# Patient Record
Sex: FEMALE | Race: WHITE | URBAN - METROPOLITAN AREA
[De-identification: names, ages, dates, MRNs, and addresses within clinical notes are randomized per-mention and may not be internally consistent; named-entity substitution may affect disease eponyms.]

---

## 2017-06-15 ENCOUNTER — EMERGENCY (EMERGENCY)
Facility: HOSPITAL | Age: 47
LOS: 0 days | Discharge: HOME | End: 2017-06-15

## 2017-06-15 DIAGNOSIS — I95.1 ORTHOSTATIC HYPOTENSION: ICD-10-CM

## 2017-06-28 DIAGNOSIS — Z87.891 PERSONAL HISTORY OF NICOTINE DEPENDENCE: ICD-10-CM
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
## 2017-06-28 DIAGNOSIS — M54.2 CERVICALGIA: ICD-10-CM
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
## 2017-06-28 DIAGNOSIS — R29.810 FACIAL WEAKNESS: ICD-10-CM

## 2017-06-28 DIAGNOSIS — Z90.711 ACQUIRED ABSENCE OF UTERUS WITH REMAINING CERVICAL STUMP: ICD-10-CM
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
## 2017-06-28 DIAGNOSIS — Z79.899 OTHER LONG TERM (CURRENT) DRUG THERAPY: ICD-10-CM

## 2017-06-28 DIAGNOSIS — M54.5 LOW BACK PAIN: ICD-10-CM

## 2017-07-10 ENCOUNTER — OUTPATIENT (OUTPATIENT)
Dept: OUTPATIENT SERVICES | Facility: HOSPITAL | Age: 47
LOS: 1 days | Discharge: HOME | End: 2017-07-10

## 2017-07-10 DIAGNOSIS — I95.1 ORTHOSTATIC HYPOTENSION: ICD-10-CM

## 2017-07-10 DIAGNOSIS — M54.5 LOW BACK PAIN: ICD-10-CM

## 2019-01-15 ENCOUNTER — RESULT REVIEW (OUTPATIENT)
Age: 49
End: 2019-01-15

## 2019-05-30 ENCOUNTER — RECORD ABSTRACTING (OUTPATIENT)
Age: 49
End: 2019-05-30

## 2019-05-30 DIAGNOSIS — Z82.0 FAMILY HISTORY OF EPILEPSY AND OTHER DISEASES OF THE NERVOUS SYSTEM: ICD-10-CM

## 2019-05-30 DIAGNOSIS — R20.8 OTHER DISTURBANCES OF SKIN SENSATION: ICD-10-CM

## 2019-05-30 DIAGNOSIS — Z84.89 FAMILY HISTORY OF OTHER SPECIFIED CONDITIONS: ICD-10-CM

## 2019-05-30 PROBLEM — Z00.00 ENCOUNTER FOR PREVENTIVE HEALTH EXAMINATION: Status: ACTIVE | Noted: 2019-05-30

## 2019-06-26 ENCOUNTER — TRANSCRIPTION ENCOUNTER (OUTPATIENT)
Age: 49
End: 2019-06-26

## 2019-07-09 ENCOUNTER — APPOINTMENT (OUTPATIENT)
Dept: OTOLARYNGOLOGY | Facility: CLINIC | Age: 49
End: 2019-07-09

## 2019-07-30 ENCOUNTER — INPATIENT (INPATIENT)
Facility: HOSPITAL | Age: 49
LOS: 1 days | Discharge: HOME | End: 2019-08-01
Attending: INTERNAL MEDICINE | Admitting: INTERNAL MEDICINE
Payer: COMMERCIAL

## 2019-07-30 VITALS
SYSTOLIC BLOOD PRESSURE: 146 MMHG | DIASTOLIC BLOOD PRESSURE: 65 MMHG | OXYGEN SATURATION: 100 % | RESPIRATION RATE: 18 BRPM | HEART RATE: 77 BPM

## 2019-07-30 DIAGNOSIS — Z90.711 ACQUIRED ABSENCE OF UTERUS WITH REMAINING CERVICAL STUMP: Chronic | ICD-10-CM

## 2019-07-30 LAB
ALBUMIN SERPL ELPH-MCNC: 4 G/DL — SIGNIFICANT CHANGE UP (ref 3.5–5.2)
ALP SERPL-CCNC: 62 U/L — SIGNIFICANT CHANGE UP (ref 30–115)
ALT FLD-CCNC: 14 U/L — SIGNIFICANT CHANGE UP (ref 0–41)
ANION GAP SERPL CALC-SCNC: 11 MMOL/L — SIGNIFICANT CHANGE UP (ref 7–14)
APTT BLD: 36 SEC — SIGNIFICANT CHANGE UP (ref 27–39.2)
AST SERPL-CCNC: 23 U/L — SIGNIFICANT CHANGE UP (ref 0–41)
BASOPHILS # BLD AUTO: 0.07 K/UL — SIGNIFICANT CHANGE UP (ref 0–0.2)
BASOPHILS NFR BLD AUTO: 1 % — SIGNIFICANT CHANGE UP (ref 0–1)
BILIRUB SERPL-MCNC: 0.7 MG/DL — SIGNIFICANT CHANGE UP (ref 0.2–1.2)
BUN SERPL-MCNC: 14 MG/DL — SIGNIFICANT CHANGE UP (ref 10–20)
CALCIUM SERPL-MCNC: 10.2 MG/DL — HIGH (ref 8.5–10.1)
CHLORIDE SERPL-SCNC: 103 MMOL/L — SIGNIFICANT CHANGE UP (ref 98–110)
CO2 SERPL-SCNC: 25 MMOL/L — SIGNIFICANT CHANGE UP (ref 17–32)
CREAT SERPL-MCNC: 0.7 MG/DL — SIGNIFICANT CHANGE UP (ref 0.7–1.5)
EOSINOPHIL # BLD AUTO: 0.11 K/UL — SIGNIFICANT CHANGE UP (ref 0–0.7)
EOSINOPHIL NFR BLD AUTO: 1.5 % — SIGNIFICANT CHANGE UP (ref 0–8)
GLUCOSE SERPL-MCNC: 89 MG/DL — SIGNIFICANT CHANGE UP (ref 70–99)
HCG SERPL QL: NEGATIVE — SIGNIFICANT CHANGE UP
HCT VFR BLD CALC: 38.7 % — SIGNIFICANT CHANGE UP (ref 37–47)
HGB BLD-MCNC: 13.1 G/DL — SIGNIFICANT CHANGE UP (ref 12–16)
IMM GRANULOCYTES NFR BLD AUTO: 0.3 % — SIGNIFICANT CHANGE UP (ref 0.1–0.3)
INR BLD: 1 RATIO — SIGNIFICANT CHANGE UP (ref 0.65–1.3)
LYMPHOCYTES # BLD AUTO: 1.88 K/UL — SIGNIFICANT CHANGE UP (ref 1.2–3.4)
LYMPHOCYTES # BLD AUTO: 25.8 % — SIGNIFICANT CHANGE UP (ref 20.5–51.1)
MAGNESIUM SERPL-MCNC: 1.9 MG/DL — SIGNIFICANT CHANGE UP (ref 1.8–2.4)
MCHC RBC-ENTMCNC: 30.9 PG — SIGNIFICANT CHANGE UP (ref 27–31)
MCHC RBC-ENTMCNC: 33.9 G/DL — SIGNIFICANT CHANGE UP (ref 32–37)
MCV RBC AUTO: 91.3 FL — SIGNIFICANT CHANGE UP (ref 81–99)
MONOCYTES # BLD AUTO: 0.59 K/UL — SIGNIFICANT CHANGE UP (ref 0.1–0.6)
MONOCYTES NFR BLD AUTO: 8.1 % — SIGNIFICANT CHANGE UP (ref 1.7–9.3)
NEUTROPHILS # BLD AUTO: 4.61 K/UL — SIGNIFICANT CHANGE UP (ref 1.4–6.5)
NEUTROPHILS NFR BLD AUTO: 63.3 % — SIGNIFICANT CHANGE UP (ref 42.2–75.2)
NRBC # BLD: 0 /100 WBCS — SIGNIFICANT CHANGE UP (ref 0–0)
PLATELET # BLD AUTO: 196 K/UL — SIGNIFICANT CHANGE UP (ref 130–400)
POTASSIUM SERPL-MCNC: 4.9 MMOL/L — SIGNIFICANT CHANGE UP (ref 3.5–5)
POTASSIUM SERPL-SCNC: 4.9 MMOL/L — SIGNIFICANT CHANGE UP (ref 3.5–5)
PROT SERPL-MCNC: 6.8 G/DL — SIGNIFICANT CHANGE UP (ref 6–8)
PROTHROM AB SERPL-ACNC: 11.5 SEC — SIGNIFICANT CHANGE UP (ref 9.95–12.87)
RBC # BLD: 4.24 M/UL — SIGNIFICANT CHANGE UP (ref 4.2–5.4)
RBC # FLD: 12.1 % — SIGNIFICANT CHANGE UP (ref 11.5–14.5)
SODIUM SERPL-SCNC: 139 MMOL/L — SIGNIFICANT CHANGE UP (ref 135–146)
WBC # BLD: 7.28 K/UL — SIGNIFICANT CHANGE UP (ref 4.8–10.8)
WBC # FLD AUTO: 7.28 K/UL — SIGNIFICANT CHANGE UP (ref 4.8–10.8)

## 2019-07-30 PROCEDURE — 93010 ELECTROCARDIOGRAM REPORT: CPT

## 2019-07-30 PROCEDURE — 99291 CRITICAL CARE FIRST HOUR: CPT

## 2019-07-30 PROCEDURE — 71045 X-RAY EXAM CHEST 1 VIEW: CPT | Mod: 26

## 2019-07-30 PROCEDURE — 99284 EMERGENCY DEPT VISIT MOD MDM: CPT

## 2019-07-30 PROCEDURE — 70450 CT HEAD/BRAIN W/O DYE: CPT | Mod: 26

## 2019-07-30 PROCEDURE — 0042T: CPT

## 2019-07-30 RX ORDER — TRAMADOL HYDROCHLORIDE 50 MG/1
50 TABLET ORAL ONCE
Refills: 0 | Status: DISCONTINUED | OUTPATIENT
Start: 2019-07-30 | End: 2019-07-30

## 2019-07-30 RX ORDER — ACETAMINOPHEN 500 MG
975 TABLET ORAL ONCE
Refills: 0 | Status: COMPLETED | OUTPATIENT
Start: 2019-07-30 | End: 2019-07-30

## 2019-07-30 RX ORDER — TOPIRAMATE 25 MG
200 TABLET ORAL
Refills: 0 | Status: DISCONTINUED | OUTPATIENT
Start: 2019-07-30 | End: 2019-08-01

## 2019-07-30 RX ORDER — ACETAMINOPHEN 500 MG
650 TABLET ORAL EVERY 6 HOURS
Refills: 0 | Status: DISCONTINUED | OUTPATIENT
Start: 2019-07-30 | End: 2019-08-01

## 2019-07-30 RX ORDER — CHLORHEXIDINE GLUCONATE 213 G/1000ML
1 SOLUTION TOPICAL
Refills: 0 | Status: DISCONTINUED | OUTPATIENT
Start: 2019-07-30 | End: 2019-08-01

## 2019-07-30 RX ORDER — ENOXAPARIN SODIUM 100 MG/ML
40 INJECTION SUBCUTANEOUS DAILY
Refills: 0 | Status: DISCONTINUED | OUTPATIENT
Start: 2019-07-30 | End: 2019-08-01

## 2019-07-30 RX ADMIN — Medication 975 MILLIGRAM(S): at 19:04

## 2019-07-30 RX ADMIN — TRAMADOL HYDROCHLORIDE 50 MILLIGRAM(S): 50 TABLET ORAL at 21:00

## 2019-07-30 RX ADMIN — Medication 975 MILLIGRAM(S): at 14:20

## 2019-07-30 RX ADMIN — TRAMADOL HYDROCHLORIDE 50 MILLIGRAM(S): 50 TABLET ORAL at 20:10

## 2019-07-30 RX ADMIN — Medication 200 MILLIGRAM(S): at 19:02

## 2019-07-30 NOTE — H&P ADULT - ASSESSMENT
48yoF with PMH of migraine?,  benign meningioma s/p craniotomy/ resection in 2015, followed by gamma knife radiation in 2016, now in remission comes to ED with headache and left arm weakness.     #) Left Sided headache with Left arm weakness/ sensory changes ( r/o TIA vs CVA vs Complicated migraine)  - pt endorses h/o migraine, states this episode was same but worse  - Stroke code on arrival, NIHSS 3  - CT head and perfusion study- unremarkable  - seen by neuro  - admit to Stroke unit, neuro check q4hr  - f/u EEG  - F/u MRI brain  - seizure precautions  - keep Mg >2, f/u PM levels  - hba1c, lipid profile f/u. 48yoF with PMH of migraine?,  benign meningioma s/p craniotomy/ resection in 2015, followed by gamma knife radiation in 2016, now in remission comes to ED with headache and left arm weakness.     #) Left Sided headache with Left arm weakness/ sensory changes ( r/o TIA vs CVA vs Complicated migraine)  - pt endorses h/o migraine, states this episode was same but worse  - Stroke code on arrival, NIHSS 3  - CT head and perfusion study- unremarkable  - seen by neuro  - admit to Stroke unit, neuro check q4hr  - f/u EEG  - F/u MRI brain  - seizure precautions  - keep Mg >2, f/u PM levels  - hba1c, lipid profile f/u.   - consider echo with bubble study- if indicated     #) H/o Meningioma   - s/p resection and gamma knife radiation      #) Diet- regular  #) Ambulate as tolerated, PT and Phy eval  #) DVT ppx- lovenox  #) GI ppx- not indicated  #) dispo- admit to stroke unit  #) full code 48yoF with PMH of migraine?,  benign meningioma s/p craniotomy/ resection in 2015, followed by gamma knife radiation in 2016, now in remission comes to ED with headache and left arm weakness.     #) Left Sided headache with Left arm weakness/ sensory changes ( r/o TIA vs CVA vs Complicated migraine)  - pt endorses h/o migraine, states this episode was same but worse  - Stroke code on arrival, NIHSS 3  - CT head and perfusion study- unremarkable  - seen by neuro  - admit to Stroke unit, neuro check q4hr  - f/u EEG  - F/u MRI brain  - seizure precautions  - keep Mg >2, f/u PM levels  - hba1c, lipid profile f/u.   - consider echo with bubble study- if indicated     #) H/o Meningioma   - s/p resection and gamma knife radiation  - c/w home med topamax - pt states was started for seizure ppx.       #) Diet- regular  #) Ambulate as tolerated, PT and Phy eval  #) DVT ppx- lovenox  #) GI ppx- not indicated  #) dispo- admit to stroke unit  #) full code

## 2019-07-30 NOTE — ED ADULT NURSE NOTE - INTERVENTIONS DEFINITIONS
Monitor for mental status changes and reorient to person, place, and time/Physically safe environment: no spills, clutter or unnecessary equipment/[place] to call system/Stretcher in lowest position, wheels locked, appropriate side rails in place

## 2019-07-30 NOTE — H&P ADULT - NSHPSOCIALHISTORY_GEN_ALL_CORE
Pt is . Pt is an active smoker, smokes half PPD for 25 years. Denies any alcohol use, recreational drug use.

## 2019-07-30 NOTE — ED ADULT NURSE NOTE - OBJECTIVE STATEMENT
Pt presents to ED BIBA for headache. As per pt last night at 8pm she started to have a headache associated with left arm pain. 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 Stroke code called on pt (please refer to stroke flowsheet.)

## 2019-07-30 NOTE — H&P ADULT - ATTENDING COMMENTS
PHYSICAL EXAM:    CONSTITUTIONAL: NAD, comfortable lying in stretcher  ENMT: PERRLA, right eye ptosis, no tonsillar erythema, exudates, or enlargement, neck supple, tenderness on deep palpation of cervical spine, No JVD  PSYCH: Alert & Oriented X3  RESPIRATORY: Clear to percussion bilaterally; No rales, rhonchi, wheezing, or rubs  CARDIOVASCULAR: Regular rate and rhythm; No murmurs, rubs, or gallops, negative edema  GASTROINTESTINAL: Soft, Nontender, Nondistended; Bowel sounds present  MSK: LUE strength 3/5, RUE 5/5, decreased sensation over LUE, bilateral lower extremity strength 5/5 and sensation intact, 2+ Peripheral Pulses, No clubbing, cyanosis  SKIN: No rashes or lesions    49 yo F with PMHx of Migraine Headaches, Benign Meningioma s/p craniotomy/resection (2015) and gamma knife radiation (2016) in remission with residual right sided facial sensory deficit and ptosis presents with complaint of sudden onset left sided, throbbing headache with radiation to the neck occurring on the night prior to presentation. Patient denies photo/phonophobia. Patient states this headache was unlike her usual headaches which is normally right sided and burning in quality. Patient then felt LUE numbness as well as weakness. Stroke Code activated in ED, not a tpa candidate. Patient denies chest pain, palpitations, shortness of breath, fever, chills, nausea, vomiting, recent travel, camping, illness, or sick contacts.     #CVA vs. Complicated Migraine Headaches vs. Epileptiform Activity   -Neurology recommendations noted  -Admit to Stroke Unit  -CT Perfusion of Head and Neck unremarkable   -F/U MRI Brain  -F/U EEG  -Neuro checks per unit/Q4H  -Seizure precautions  -PT/OT  -F/U Hgb A1C%, lipid panel     #History of Meningioma   -Continue Topamax   -F/U Topiramate level in AM    #Active Smoker  -Smoking cessation advised, declines nicotine patch    Disposition: Home when medically stable PHYSICAL EXAM:    CONSTITUTIONAL: NAD, comfortable lying in stretcher  ENMT: PERRLA, right eye ptosis, no tonsillar erythema, exudates, or enlargement, neck supple, tenderness on deep palpation of cervical spine, No JVD  PSYCH: Alert & Oriented X3  RESPIRATORY: Clear to percussion bilaterally; No rales, rhonchi, wheezing, or rubs  CARDIOVASCULAR: Regular rate and rhythm; No murmurs, rubs, or gallops, negative edema  GASTROINTESTINAL: Soft, Nontender, Nondistended; Bowel sounds present  MSK: LUE strength 3/5, RUE 5/5, decreased sensation over LUE, bilateral lower extremity strength 5/5 and sensation intact, 2+ Peripheral Pulses, No clubbing, cyanosis  SKIN: No rashes or lesions    49 yo F with PMHx of Migraine Headaches, Benign Meningioma s/p craniotomy/resection (2015) and gamma knife radiation (2016) in remission with residual right sided facial sensory deficit and ptosis presents with complaint of sudden onset left sided, throbbing headache with radiation to the neck occurring on the night prior to presentation. Patient denies photo/phonophobia. Patient states this headache was unlike her usual headaches which is normally right sided and burning in quality. Patient then felt LUE numbness as well as weakness. Stroke Code activated in ED, not a tpa candidate. Patient denies chest pain, palpitations, shortness of breath, fever, chills, nausea, vomiting, recent travel, camping, illness, or sick contacts.     #CVA vs. Complicated Migraine Headaches vs. Epileptiform Activity   -Neurology recommendations noted  -Admit to Stroke Unit  -CT Perfusion of Head and Neck unremarkable   -F/U MRI Brain  -F/U EEG  -F/U 2D-Echo  -Neuro checks per unit/Q4H  -Seizure precautions  -PT/OT  -F/U Hgb A1C%, lipid panel     #History of Meningioma   -Continue Topamax   -F/U Topiramate level in AM    #Active Smoker  -Smoking cessation advised, declines nicotine patch    Disposition: Home when medically stable PHYSICAL EXAM:    CONSTITUTIONAL: NAD, comfortable lying in stretcher  ENMT: PERRLA, right eye ptosis, no tonsillar erythema, exudates, or enlargement, neck supple, tenderness on deep palpation of cervical spine, No JVD  PSYCH: Alert & Oriented X3  RESPIRATORY: Clear to percussion bilaterally; No rales, rhonchi, wheezing, or rubs  CARDIOVASCULAR: Regular rate and rhythm; No murmurs, rubs, or gallops, negative edema  GASTROINTESTINAL: Soft, Nontender, Nondistended; Bowel sounds present  MSK: LUE strength 3/5, RUE 5/5, decreased sensation over LUE, bilateral lower extremity strength 5/5 and sensation intact, 2+ Peripheral Pulses, No clubbing, cyanosis  SKIN: No rashes or lesions    47 yo F with PMHx of Migraine Headaches, Benign Meningioma s/p craniotomy/resection (2015) and gamma knife radiation (2016) in remission with residual right sided facial sensory deficit and ptosis presents with complaint of sudden onset left sided, throbbing headache with radiation to the neck occurring on the night prior to presentation. Patient denies photo/phonophobia. Patient states this headache was unlike her usual headaches which is normally right sided and burning in quality. Patient then felt LUE numbness as well as weakness. Stroke Code activated in ED, not a tpa candidate. Patient denies chest pain, palpitations, shortness of breath, fever, chills, nausea, vomiting, recent travel, camping, illness, or sick contacts.     #CVA vs. Complicated Migraine Headaches vs. Epileptiform Activity   -Neurology recommendations noted  -Admit to Stroke Unit  -CT Perfusion of Head and Neck unremarkable   -F/U MRI Brain  -F/U EEG  -F/U 2D-Echo  -Neuro checks per unit/Q4H  -Seizure precautions  -PT/OT  -F/U Hgb A1C%, lipid panel     #History of Meningioma   -Continue Topamax   -F/U Topiramate level in AM    #Active Smoker  -Smoking cessation advised, declines nicotine patch    Disposition: Home when medically stable.

## 2019-07-30 NOTE — ED PROVIDER NOTE - CHIEF COMPLAINT
The patient is a 48y Female complaining of The patient is a 48y Female complaining of left arm weakness

## 2019-07-30 NOTE — ED PROVIDER NOTE - PHYSICAL EXAMINATION
VITALS:  I have reviewed the initial vital signs.  GENERAL: Well-developed, well-nourished, in no acute distress. Nontoxic.  HEENT: NC/AT. Sclera clear. No conjunctival injection. EOMI, PERRLA. Mucous membranes moist.  NECK: supple w FROM. No cervical adenopathy.  CARDIO: RRR, nl S1 and S2. No murmurs, rubs, or gallops. No peripheral edema. 2+ radial pulses bilaterally.  PULM: Normal effort. No tachypnea or retractions. CTA b/l without wheezes, rales, or rhonchi.  MSK: FROM to extremities x 4. No joint swelling, erythema, or ttp.  GI: Abdomen soft and non-distended. Nontender in all four quadrants without rebound or guarding.  SKIN: Warm, dry. No pallor or rashes. Capillary refill <2 seconds.  NEURO: A&Ox3. Speech clear. No facial droop. CN II-XII intact. 4/5 strength to left upper extremity, otherwise 5/5 strength to upper and lower extremities b/l. Sensation intact and equal throughout. No pronator drift. Finger to nose intact. Normal heel to shin.

## 2019-07-30 NOTE — ED ADULT NURSE NOTE - CAS ED AMA REASON YN
Yes/pt states she did not want to wait anymore for mri results. md shine is aware. pt left before dr came down to sign papers. at time pt was A&OX4. VSS

## 2019-07-30 NOTE — ED PROVIDER NOTE - CLINICAL SUMMARY MEDICAL DECISION MAKING FREE TEXT BOX
48yoF with h/o brain tumor s/p resection and radiation in remission and baseline R ptosis and asensation of R face, also on topiramate and Xanax, p/w c/f stroke. States 9-10pm last night she was at home and began having headache which then led to paresthesias, numbness, and weakness of her L arm. Drove herself to PMD today and was sent here. On exam, afebrile, hemodynamically stable, saturating well, NAD, mildly sedate/flat affect, nontoxic appearing, head NCAT, PERRL, EOMI, anicteric, MMM, RRR, nml S1/S2, no m/r/g, lungs CTAB, no w/r/r, abd soft, NT, ND, nml BS, no rebound or guarding, AAOx3, CN's 3-12 intact other than R ptosis and decreased R facial sensation, L arm stiffness and drift, also L leg weakness with nml sensation, no leg cyanosis or edema, 2+ symm radial pulses, skin warm, well perfused, no rashes or hives. Character/stiffness as well as past h/o migraines of lower suspicion for acute CVA, also not TPA candidate. Code Stroke called and co-managed with Dr. Maximo Carreon of neurology, recommends CT perfusion. No e/o acute mass on CT. Character of low suspicion for dissection and no associated signs. No e/o arrhythmia, anemia, or gross electrolyte abnormalities. Unremarkable CT perfusion/angio. Patient well appearing, hemodynamically stable. Admitted to stroke unit for further monitoring, w/u, and care.

## 2019-07-30 NOTE — ED PROVIDER NOTE - OBJECTIVE STATEMENT
48 year old female w hx of benign meningioma s/p gamma knife radiation 2016, followed by Dr. Ruiz, otherwise no pmhx presents to the ED via EMS with a left-sided, nonradiating, constant, throbbing headache since 9:00 PM last night. Patient also endorses left arm pain, numbness, and weakness since that time as well. Denies head trauma/LOC, vision changes, facial droop, speech changes, neck pain/stiffness, chest pain, shortness of breath, abd pain, n/v, gait difficulty, rashes, recent travel or illness.

## 2019-07-30 NOTE — PROGRESS NOTE ADULT - SUBJECTIVE AND OBJECTIVE BOX
HPI:  She is a 48 year old right handed female with a PMH of benign meningioma s/p gamma knife radiation 2016, presents to the ED with left-sided, throbbing headache and since 9:00 PM on the night prior to presentation. She expresses left arm numbness, pain and weakness since then as well. Stroke code called on arrival to the ED for left upper extremity drift with right facial sensory deficit and weakness since gamma knife procedure in 2016.    Tests:  < from: CT Brain Stroke Protocol (07.30.19 @ 12:47) >  IMPRESSION:    In comparison with the prior noncontrast CT scan of the brain dated June 26, 2016:    Stable examination.    No acute intracranial hemorrhage or large territorial infarct.    Vitals  T(F): 97.7 (07-30-19 @ 12:57), Max: 97.7 (07-30-19 @ 12:57)  HR: 74 (07-30-19 @ 13:02) (74 - 77)  BP: 104/52 (07-30-19 @ 13:02) (104/52 - 146/65)  RR: 16 (07-30-19 @ 13:02) (16 - 18)  SpO2: 99% (07-30-19 @ 13:02) (98% - 100%)                        13.1   7.28  )-----------( 196      ( 30 Jul 2019 13:10 )             38.7     Neuro Exam:  Awake alert oriented to self place time.  PEARLA EOMI no gaze no visual deficit.  No aphasia no dysarthria, chronic right facial palsy.  No drift of RUE, RLE/LLE. Drift present in LUE.   No ataxia of upper or lower extremities.   No sensory deficit besides chronic right facial sensory deficit.  No neglect or inattention.  NIHSS:3  Impression:  She is a 48 year old right handed female with a PMH of benign meningioma s/p gamma knife radiation 2016, presents to the ED with left-sided, throbbing headache and since 9:00 PM on the night prior to presentation. She expresses left arm numbness, pain and weakness since then as well. Stroke code called on arrival to the ED for left upper extremity drift with right facial sensory deficit and weakness since gamma knife procedure in 2016.  Etiology of symptoms unknown, vascular and epileptiform cause should be ruled out.     Suggestions:  CT Perfusion stat.  MRI brain with and without sergio.  Stat EEG.  Seizure precautions.  Keep Magnesium level >2.  Suggest IV hydration if medically able.   Q4 neuro checks.      Cody Smith NP  [phone]

## 2019-07-30 NOTE — PROGRESS NOTE ADULT - ATTENDING COMMENTS
Patient seen and examined with NP during stroke code.  Patient had left hand weakness since last night.  Had resection of meningioma in right temporal region and gamma knife radiation    NIHSS 3  mrankin 1    Plan as above

## 2019-07-30 NOTE — ED PROVIDER NOTE - ATTENDING CONTRIBUTION TO CARE
48yoF with h/o brain tumor s/p resection and radiation in remission and baseline R ptosis and asensation of R face, also on topiramate and Xanax, p/w c/f stroke. States 9-10pm last night she was at home and began having headache which then led to paresthesias, numbness, and weakness of her L arm. Drove herself to PMD today and was sent here. On exam, afebrile, hemodynamically stable, saturating well, NAD, mildly sedate/flat affect, nontoxic appearing, head NCAT, PERRL, EOMI, anicteric, MMM, RRR, nml S1/S2, no m/r/g, lungs CTAB, no w/r/r, abd soft, NT, ND, nml BS, no rebound or guarding, AAOx3, CN's 3-12 intact other than R ptosis and decreased R facial sensation, L arm stiffness and drift, also L leg weakness with nml sensation, no leg cyanosis or edema, 2+ symm radial pulses, skin warm, well perfused, no rashes or hives. Character/stiffness as well as past h/o migraines of lower suspicion for acute CVA, also not TPA candidate. Code Stroke called and co-managed with Dr. Maximo Carreon of neurology, recommends CT perfusion. No e/o acute mass on CT. Character of low suspicion for dissection and no associated signs. No e/o arrhythmia, anemia, or gross electrolyte abnormalities. 48yoF with h/o brain tumor s/p resection and radiation in remission and baseline R ptosis and asensation of R face, also on topiramate and Xanax, p/w c/f stroke. States 9-10pm last night she was at home and began having headache which then led to paresthesias, numbness, and weakness of her L arm. Drove herself to PMD today and was sent here. On exam, afebrile, hemodynamically stable, saturating well, NAD, mildly sedate/flat affect, nontoxic appearing, head NCAT, PERRL, EOMI, anicteric, MMM, RRR, nml S1/S2, no m/r/g, lungs CTAB, no w/r/r, abd soft, NT, ND, nml BS, no rebound or guarding, AAOx3, CN's 3-12 intact other than R ptosis and decreased R facial sensation, L arm stiffness and drift, also L leg weakness with nml sensation, no leg cyanosis or edema, 2+ symm radial pulses, skin warm, well perfused, no rashes or hives. Character/stiffness as well as past h/o migraines of lower suspicion for acute CVA, also not TPA candidate. Code Stroke called and co-managed with Dr. Maximo Carreon of neurology, recommends CT perfusion. No e/o acute mass on CT. Character of low suspicion for dissection and no associated signs. No e/o arrhythmia, anemia, or gross electrolyte abnormalities. Unremarkable CT perfusion/angio. Patient well appearing, hemodynamically stable. Admitted to stroke unit for further monitoring, w/u, and care.

## 2019-07-30 NOTE — H&P ADULT - NSHPLABSRESULTS_GEN_ALL_CORE
13.1   7.28  )-----------( 196      ( 30 Jul 2019 13:10 )             38.7       07-30    139  |  103  |  14  ----------------------------<  89  4.9   |  25  |  0.7    Ca    10.2<H>      30 Jul 2019 13:10    TPro  6.8  /  Alb  4.0  /  TBili  0.7  /  DBili  x   /  AST  23  /  ALT  14  /  AlkPhos  62  07-30                  PT/INR - ( 30 Jul 2019 13:10 )   PT: 11.50 sec;   INR: 1.00 ratio         PTT - ( 30 Jul 2019 13:10 )  PTT:36.0 sec    Lactate Trend      CAPILLARY BLOOD GLUCOSE  100 (30 Jul 2019 13:24)      POCT Blood Glucose.: 100 mg/dL (30 Jul 2019 12:35)    IMPRESSION:    In comparison with the prior noncontrast CT scan of the brain dated June 26, 2016:    Stable examination.    No acute intracranial hemorrhage or large territorial infarct.    < end of copied text >    < from: CT Perfusion w/ Maps w/ IV Cont (07.30.19 @ 14:34) >    IMPRESSION:    1.  Perfusion study, unremarkable.    2.  CTA of the head and neck, unremarkable.    < end of copied text >

## 2019-07-30 NOTE — ED PROVIDER NOTE - NS ED ROS FT
CONSTITUTIONAL: (-) fevers, (-) chills, (-) decreased appetite, (-) diaphoresis  EYES: (-) vision changes, (-) blurry vision, (-) double vision  ENT: (-) congestion, (-) rhinorrhea  NECK: (-) neck pain, (-) neck stiffness  CARDIO: (-) chest pain, (-) palpitations, (-) edema  PULM: (-) cough, (-) sputum, (-) shortness of breath  GI: (-) nausea, (-) vomiting, (-) diarrhea, (-) constipation, (-) abdominal pain, (-) melena, (-) hematochezia  : (-) dysuria, (-) hematuria, (-) frequency, (-) flank pain  MSK: (-) back pain, (-) joint swelling, (-) joint stiffness, (-) gait difficulty  SKIN: (-) rashes, (-) wounds, (-) pallor, (-) ecchymosis  NEURO: see HPI    *all other systems negative except as documented above and in the HPI*

## 2019-07-31 LAB
ANION GAP SERPL CALC-SCNC: 10 MMOL/L — SIGNIFICANT CHANGE UP (ref 7–14)
BASOPHILS # BLD AUTO: 0.04 K/UL — SIGNIFICANT CHANGE UP (ref 0–0.2)
BASOPHILS NFR BLD AUTO: 0.8 % — SIGNIFICANT CHANGE UP (ref 0–1)
BUN SERPL-MCNC: 14 MG/DL — SIGNIFICANT CHANGE UP (ref 10–20)
CALCIUM SERPL-MCNC: 9.7 MG/DL — SIGNIFICANT CHANGE UP (ref 8.5–10.1)
CHLORIDE SERPL-SCNC: 109 MMOL/L — SIGNIFICANT CHANGE UP (ref 98–110)
CHOLEST SERPL-MCNC: 197 MG/DL — SIGNIFICANT CHANGE UP (ref 100–200)
CO2 SERPL-SCNC: 22 MMOL/L — SIGNIFICANT CHANGE UP (ref 17–32)
CREAT SERPL-MCNC: 0.6 MG/DL — LOW (ref 0.7–1.5)
EOSINOPHIL # BLD AUTO: 0.16 K/UL — SIGNIFICANT CHANGE UP (ref 0–0.7)
EOSINOPHIL NFR BLD AUTO: 3.3 % — SIGNIFICANT CHANGE UP (ref 0–8)
ESTIMATED AVERAGE GLUCOSE: 91 MG/DL — SIGNIFICANT CHANGE UP (ref 68–114)
GLUCOSE SERPL-MCNC: 95 MG/DL — SIGNIFICANT CHANGE UP (ref 70–99)
HBA1C BLD-MCNC: 4.8 % — SIGNIFICANT CHANGE UP (ref 4–5.6)
HCT VFR BLD CALC: 35.8 % — LOW (ref 37–47)
HDLC SERPL-MCNC: 54 MG/DL — SIGNIFICANT CHANGE UP
HGB BLD-MCNC: 12 G/DL — SIGNIFICANT CHANGE UP (ref 12–16)
IMM GRANULOCYTES NFR BLD AUTO: 0.2 % — SIGNIFICANT CHANGE UP (ref 0.1–0.3)
LIPID PNL WITH DIRECT LDL SERPL: 134 MG/DL — HIGH (ref 4–129)
LYMPHOCYTES # BLD AUTO: 2.48 K/UL — SIGNIFICANT CHANGE UP (ref 1.2–3.4)
LYMPHOCYTES # BLD AUTO: 50.6 % — SIGNIFICANT CHANGE UP (ref 20.5–51.1)
MAGNESIUM SERPL-MCNC: 2 MG/DL — SIGNIFICANT CHANGE UP (ref 1.8–2.4)
MCHC RBC-ENTMCNC: 30.8 PG — SIGNIFICANT CHANGE UP (ref 27–31)
MCHC RBC-ENTMCNC: 33.5 G/DL — SIGNIFICANT CHANGE UP (ref 32–37)
MCV RBC AUTO: 91.8 FL — SIGNIFICANT CHANGE UP (ref 81–99)
MONOCYTES # BLD AUTO: 0.39 K/UL — SIGNIFICANT CHANGE UP (ref 0.1–0.6)
MONOCYTES NFR BLD AUTO: 8 % — SIGNIFICANT CHANGE UP (ref 1.7–9.3)
NEUTROPHILS # BLD AUTO: 1.82 K/UL — SIGNIFICANT CHANGE UP (ref 1.4–6.5)
NEUTROPHILS NFR BLD AUTO: 37.1 % — LOW (ref 42.2–75.2)
NRBC # BLD: 0 /100 WBCS — SIGNIFICANT CHANGE UP (ref 0–0)
PLATELET # BLD AUTO: 168 K/UL — SIGNIFICANT CHANGE UP (ref 130–400)
POTASSIUM SERPL-MCNC: 4.4 MMOL/L — SIGNIFICANT CHANGE UP (ref 3.5–5)
POTASSIUM SERPL-SCNC: 4.4 MMOL/L — SIGNIFICANT CHANGE UP (ref 3.5–5)
RBC # BLD: 3.9 M/UL — LOW (ref 4.2–5.4)
RBC # FLD: 12.1 % — SIGNIFICANT CHANGE UP (ref 11.5–14.5)
SODIUM SERPL-SCNC: 141 MMOL/L — SIGNIFICANT CHANGE UP (ref 135–146)
TOTAL CHOLESTEROL/HDL RATIO MEASUREMENT: 3.6 RATIO — LOW (ref 4–5.5)
TRIGL SERPL-MCNC: 129 MG/DL — SIGNIFICANT CHANGE UP (ref 10–149)
WBC # BLD: 4.9 K/UL — SIGNIFICANT CHANGE UP (ref 4.8–10.8)
WBC # FLD AUTO: 4.9 K/UL — SIGNIFICANT CHANGE UP (ref 4.8–10.8)

## 2019-07-31 PROCEDURE — 72040 X-RAY EXAM NECK SPINE 2-3 VW: CPT | Mod: 26

## 2019-07-31 PROCEDURE — 93306 TTE W/DOPPLER COMPLETE: CPT | Mod: 26

## 2019-07-31 RX ADMIN — Medication 200 MILLIGRAM(S): at 18:01

## 2019-07-31 RX ADMIN — Medication 200 MILLIGRAM(S): at 05:59

## 2019-07-31 RX ADMIN — ENOXAPARIN SODIUM 40 MILLIGRAM(S): 100 INJECTION SUBCUTANEOUS at 13:41

## 2019-07-31 NOTE — PROGRESS NOTE ADULT - ASSESSMENT
ADITHYA YO 48y Female  MRN#: [id number]   CODE STATUS:_full code       SUBJECTIVE  Patient is a 48y old Female who presents with a chief complaint of Headache/ Left arm weakness (30 Jul 2019 17:40)  Currently admitted to medicine with the primary diagnosis of Left arm weakness   Today is hospital day 1d, and this morning she is resting comfortably in bed and reports no overnight events.     OBJECTIVE  PAST MEDICAL & SURGICAL HISTORY  Migraine  Meningioma  S/P partial hysterectomy    ALLERGIES:  Trileptal (Other (Severe))    MEDICATIONS:  STANDING MEDICATIONS  chlorhexidine 4% Liquid 1 Application(s) Topical <User Schedule>  enoxaparin Injectable 40 milliGRAM(s) SubCutaneous daily  topiramate 200 milliGRAM(s) Oral two times a day    PRN MEDICATIONS  acetaminophen   Tablet .. 650 milliGRAM(s) Oral every 6 hours PRN      VITAL SIGNS: Last 24 Hours  T(C): 36.6 (31 Jul 2019 07:26), Max: 36.6 (31 Jul 2019 07:26)  T(F): 97.9 (31 Jul 2019 07:26), Max: 97.9 (31 Jul 2019 07:26)  HR: 66 (31 Jul 2019 07:26) (59 - 77)  BP: 98/55 (31 Jul 2019 07:26) (96/56 - 146/65)  BP(mean): 78 (30 Jul 2019 19:43) (78 - 78)  RR: 16 (31 Jul 2019 07:26) (16 - 18)  SpO2: 98% (31 Jul 2019 07:26) (97% - 100%)    LABS:                        12.0   4.90  )-----------( 168      ( 31 Jul 2019 05:58 )             35.8     07-31    141  |  109  |  14  ----------------------------<  95  4.4   |  22  |  0.6<L>    Ca    9.7      31 Jul 2019 05:58  Mg     2.0     07-31    TPro  6.8  /  Alb  4.0  /  TBili  0.7  /  DBili  x   /  AST  23  /  ALT  14  /  AlkPhos  62  07-30    PT/INR - ( 30 Jul 2019 13:10 )   PT: 11.50 sec;   INR: 1.00 ratio      PTT - ( 30 Jul 2019 13:10 )  PTT:36.0 sec    RADIOLOGY:    PHYSICAL EXAM:  	GENERAL: NAD, well-developed  	HEAD:  Atraumatic, Normocephalic.   	EYES: Right ptosis, conjunctiva and sclera clear  	NECK: Supple, No JVD  	CHEST/LUNG: Clear to auscultation bilaterally; No wheeze  	HEART: Regular rate and rhythm; No murmurs, rubs, or gallops  	ABDOMEN: Soft, Nontender, Nondistended; Bowel sounds present  	EXTREMITIES:  2+ Peripheral Pulses, No clubbing, cyanosis, or edema  	PSYCH: AAOx3  	NEUROLOGY: No aphasia, no dysarthria, LUE strength 3/5, RUE 5/5, decreased sensation over LUE, bilateral lower extremity strength 5/5 and sensation intact,               SKIN: No rashes or lesions      ASSESSMENT & PLAN  47 yo F with PMHx of Migraine Headaches, Benign Meningioma s/p craniotomy/resection (2015) and gamma knife radiation (2016) in remission with residual right sided facial sensory deficit and ptosis presents with complaint of sudden onset left sided, throbbing headache with radiation to the neck occurring on the night prior to presentation. Patient denies photo/phonophobia. Patient states this headache was unlike her usual headaches which is normally right sided and burning in quality. Patient then felt LUE numbness as well as weakness. Stroke Code activated in ED, not a tpa candidate. Patient denies chest pain, palpitations, shortness of breath, fever, chills, nausea, vomiting, recent travel, camping, illness, or sick contacts.     #left upper extremity drift with right facial sensory deficit and weakness since gamma knife procedure in 2016.  - Etiology of symptoms unknown, vascular and epileptiform cause should be ruled out.   - Neurology recommendations noted  - Admit to Stroke Unit  - CT Perfusion of Head and Neck unremarkable   - F/U MRI Brain  - F/U EEG  - F/U 2D-Echo  - Neuro checks per unit/Q4H  - Seizure precautions  - PT/OT     #History of Meningioma   -Continue Topamax   -F/U Topiramate level in AM    #Active Smoker  -Smoking cessation advised, declines nicotine patch    # Disposition: Home when medically stable.

## 2019-07-31 NOTE — CONSULT NOTE ADULT - ASSESSMENT
IMPRESSION: Rehab of   LUE weakness, R/O CVA, if MRI of brain is negative I suggest MRI of the cervical spine; I added cervical spine Xrays    PRECAUTIONS: [  ] Cardiac  [  ] Respiratory  [  ] Seizures [  ] Contact Isolation  [  ] Droplet Isolation  [  ] Other    Weight Bearing Status:     RECOMMENDATION:    Out of Bed to Chair     DVT/Decubiti Prophylaxis    REHAB PLAN:     [  xx ] Bedside P/T 3-5 times a week   [   ]   Bedside O/T  2-3 times a week             [   ] No Rehab Therapy Indicated                   [   ]  Speech Therapy   Conditioning/ROM                                    ADL  Bed Mobility                                               Conditioning/ROM  Transfers                                                     Bed Mobility  Sitting /Standing Balance                         Transfers                                        Gait Training                                               Sitting/Standing Balance  Stair Training [   ]Applicable                    Home equipment Eval                                                                        Splinting  [   ] Only      GOALS:   ADL   [ xx  ]   Independent                    Transfers  [ xx  ] Independent                          Ambulation  [  xx ] Independent     [ xx   ] ??With device                            [   ]  CG                                                         [   ]  CG                                                                  [   ] CG                            [    ] Min A                                                   [   ] Min A                                                              [   ] Min  A          DISCHARGE PLAN:   [   ]  Good candidate for Intensive Rehabilitation/Hospital based-4A [facility]                                             Will tolerate 3hrs Intensive Rehab Daily                                       [    ]  Short Term Rehab in Skilled Nursing Facility                                       [ xx   ]  Home with Outpatient or VN services                                         [    ]  Possible Candidate for Intensive Hospital based Rehab

## 2019-07-31 NOTE — CONSULT NOTE ADULT - SUBJECTIVE AND OBJECTIVE BOX
HPI:  Pt is a 48yoF with PMH of migraine?,  benign meningioma s/p craniotomy/ resection in 2015, followed by gamma knife radiation in 2016, now in remission comes to ED with headache and left arm weakness. Pt at baseline has right sided ptosis, reduced sensation of R face?.   History dates back to last night, when pt started having left sided temporal and occipital headache, constant, radiating to neck. Pain was gradual in onset, 9/10 during its peak, not relieved by advil. Pt then started experiencing left arm pain with pins and needles sensations with left arm weakness. Denies head trauma/LOC, vision changes, facial droop, speech changes, chest pain, shortness of breath, abd pain, n/v, gait difficulty, rashes, recent travel or illness. Pt was referred here by her PMD-  was a stroke code on arrival, no tpa given, NIHSS 3 on arrival.   admission labs were unremarkable, CT head and perfusion study were normal.   Seen by neurology on arrival. (30 Jul 2019 17:40)      PAST MEDICAL & SURGICAL HISTORY:  Migraine  Meningioma  S/P partial hysterectomy      Hospital Course:  Headache and neck pain and LUE weakness. Awaiting MRI of brain.  TODAY'S SUBJECTIVE & REVIEW OF SYMPTOMS:     Constitutional WNL   Cardio WNL   Resp WNL   GI WNL  Heme WNL  Endo WNL  Skin WNL  MSK WNL  Neuro WNL  Cognitive WNL  Psych WNL      MEDICATIONS  (STANDING):  chlorhexidine 4% Liquid 1 Application(s) Topical <User Schedule>  enoxaparin Injectable 40 milliGRAM(s) SubCutaneous daily  topiramate 200 milliGRAM(s) Oral two times a day    MEDICATIONS  (PRN):  acetaminophen   Tablet .. 650 milliGRAM(s) Oral every 6 hours PRN Moderate Pain (4 - 6)      FAMILY HISTORY:  No pertinent family history in first degree relatives      Allergies    Trileptal (Other (Severe))    Intolerances        SOCIAL HISTORY:    [  ] Etoh  [  ] Smoking  [  ] Substance abuse     Home Environment:  [  ] Home Alone  [  ] Lives with Family  [  ] Home Health Aid    Dwelling:  [  ] Apartment  [  ] Private House  [  ] Adult Home  [  ] Skilled Nursing Facility      [  ] Short Term  [  ] Long Term  [  ] Stairs       Elevator [  ]    FUNCTIONAL STATUS PTA: (Check all that apply)  Ambulation: [ x  ]Independent    [  ] Dependent     [  ] Non-Ambulatory  Assistive Device: [  ] SA Cane  [  ]  Q Cane  [  ] Walker  [  ]  Wheelchair  ADL : [x  ] Independent  [  ]  Dependent       Vital Signs Last 24 Hrs  T(C): 36.7 (31 Jul 2019 15:00), Max: 36.7 (31 Jul 2019 15:00)  T(F): 98 (31 Jul 2019 15:00), Max: 98 (31 Jul 2019 15:00)  HR: 79 (31 Jul 2019 15:00) (66 - 79)  BP: 103/54 (31 Jul 2019 15:00) (96/56 - 103/54)  BP(mean): 73 (31 Jul 2019 15:00) (73 - 78)  RR: 16 (31 Jul 2019 15:00) (16 - 18)  SpO2: 98% (31 Jul 2019 15:00) (97% - 98%)      PHYSICAL EXAM: Alert & Oriented X3  GENERAL: NAD, well-groomed, well-developed  HEAD:  Atraumatic, Normocephalic  EYES: EOMI, PERRLA, conjunctiva and sclera clear  NECK: some tenderness cervical paraspinals  CHEST/LUNG: Clear to percussion bilaterally; No rales, rhonchi, wheezing, or rubs  HEART: Regular rate and rhythm; No murmurs, rubs, or gallops  ABDOMEN: Soft, Nontender, Nondistended; Bowel sounds present  EXTREMITIES:  2+ Peripheral Pulses, No clubbing, cyanosis, or edema    NERVOUS SYSTEM:  Cranial Nerves 2-12 intact [  ] Abnormal  [  ]  ROM: WFL all extremities [  ]  Abnormal [  ]  Motor Strength: WFL all extremities  [  ]  Abnormal [  ]   5/5 LLE, LUE 3/5  Sensation: intact to light touch [  ] Abnormal [  ]  Reflexes: Symmetric [  ]  Abnormal [  ]    FUNCTIONAL STATUS:  Bed Mobility: Independent [  ]  Supervision [  ]  Needs Assistance [  ]  N/A [  ]  Transfers: Independent [  ]  Supervision [  ]  Needs Assistance [  ]  N/A [  ]   Ambulation: Independent [  ]  Supervision [  ]  Needs Assistance [  ]  N/A [  ]  ADL: Independent [  ] Requires Assistance [  ] N/A [  ]      LABS:                        12.0   4.90  )-----------( 168      ( 31 Jul 2019 05:58 )             35.8     07-31    141  |  109  |  14  ----------------------------<  95  4.4   |  22  |  0.6<L>    Ca    9.7      31 Jul 2019 05:58  Mg     2.0     07-31    TPro  6.8  /  Alb  4.0  /  TBili  0.7  /  DBili  x   /  AST  23  /  ALT  14  /  AlkPhos  62  07-30    PT/INR - ( 30 Jul 2019 13:10 )   PT: 11.50 sec;   INR: 1.00 ratio         PTT - ( 30 Jul 2019 13:10 )  PTT:36.0 sec      RADIOLOGY & ADDITIONAL STUDIES:    Assesment:

## 2019-08-01 VITALS
SYSTOLIC BLOOD PRESSURE: 93 MMHG | RESPIRATION RATE: 18 BRPM | OXYGEN SATURATION: 99 % | DIASTOLIC BLOOD PRESSURE: 55 MMHG | HEART RATE: 82 BPM | TEMPERATURE: 98 F

## 2019-08-01 LAB
ANION GAP SERPL CALC-SCNC: 10 MMOL/L — SIGNIFICANT CHANGE UP (ref 7–14)
BASOPHILS # BLD AUTO: 0.06 K/UL — SIGNIFICANT CHANGE UP (ref 0–0.2)
BASOPHILS NFR BLD AUTO: 1.2 % — HIGH (ref 0–1)
BUN SERPL-MCNC: 20 MG/DL — SIGNIFICANT CHANGE UP (ref 10–20)
CALCIUM SERPL-MCNC: 9.4 MG/DL — SIGNIFICANT CHANGE UP (ref 8.5–10.1)
CHLORIDE SERPL-SCNC: 108 MMOL/L — SIGNIFICANT CHANGE UP (ref 98–110)
CO2 SERPL-SCNC: 23 MMOL/L — SIGNIFICANT CHANGE UP (ref 17–32)
CREAT SERPL-MCNC: 0.7 MG/DL — SIGNIFICANT CHANGE UP (ref 0.7–1.5)
EOSINOPHIL # BLD AUTO: 0.16 K/UL — SIGNIFICANT CHANGE UP (ref 0–0.7)
EOSINOPHIL NFR BLD AUTO: 3.2 % — SIGNIFICANT CHANGE UP (ref 0–8)
GLUCOSE SERPL-MCNC: 98 MG/DL — SIGNIFICANT CHANGE UP (ref 70–99)
HCT VFR BLD CALC: 36.5 % — LOW (ref 37–47)
HGB BLD-MCNC: 12.5 G/DL — SIGNIFICANT CHANGE UP (ref 12–16)
IMM GRANULOCYTES NFR BLD AUTO: 0.4 % — HIGH (ref 0.1–0.3)
LYMPHOCYTES # BLD AUTO: 2.3 K/UL — SIGNIFICANT CHANGE UP (ref 1.2–3.4)
LYMPHOCYTES # BLD AUTO: 46.3 % — SIGNIFICANT CHANGE UP (ref 20.5–51.1)
MCHC RBC-ENTMCNC: 31.4 PG — HIGH (ref 27–31)
MCHC RBC-ENTMCNC: 34.2 G/DL — SIGNIFICANT CHANGE UP (ref 32–37)
MCV RBC AUTO: 91.7 FL — SIGNIFICANT CHANGE UP (ref 81–99)
MONOCYTES # BLD AUTO: 0.38 K/UL — SIGNIFICANT CHANGE UP (ref 0.1–0.6)
MONOCYTES NFR BLD AUTO: 7.6 % — SIGNIFICANT CHANGE UP (ref 1.7–9.3)
NEUTROPHILS # BLD AUTO: 2.05 K/UL — SIGNIFICANT CHANGE UP (ref 1.4–6.5)
NEUTROPHILS NFR BLD AUTO: 41.3 % — LOW (ref 42.2–75.2)
NRBC # BLD: 0 /100 WBCS — SIGNIFICANT CHANGE UP (ref 0–0)
PLATELET # BLD AUTO: 152 K/UL — SIGNIFICANT CHANGE UP (ref 130–400)
POTASSIUM SERPL-MCNC: 4 MMOL/L — SIGNIFICANT CHANGE UP (ref 3.5–5)
POTASSIUM SERPL-SCNC: 4 MMOL/L — SIGNIFICANT CHANGE UP (ref 3.5–5)
RBC # BLD: 3.98 M/UL — LOW (ref 4.2–5.4)
RBC # FLD: 12 % — SIGNIFICANT CHANGE UP (ref 11.5–14.5)
SODIUM SERPL-SCNC: 141 MMOL/L — SIGNIFICANT CHANGE UP (ref 135–146)
WBC # BLD: 4.97 K/UL — SIGNIFICANT CHANGE UP (ref 4.8–10.8)
WBC # FLD AUTO: 4.97 K/UL — SIGNIFICANT CHANGE UP (ref 4.8–10.8)

## 2019-08-01 PROCEDURE — 70553 MRI BRAIN STEM W/O & W/DYE: CPT | Mod: 26

## 2019-08-01 PROCEDURE — 99239 HOSP IP/OBS DSCHRG MGMT >30: CPT

## 2019-08-01 RX ORDER — TOPIRAMATE 25 MG
1 TABLET ORAL
Qty: 0 | Refills: 0 | DISCHARGE

## 2019-08-01 RX ORDER — TOPIRAMATE 25 MG
1 TABLET ORAL
Qty: 0 | Refills: 0 | DISCHARGE
Start: 2019-08-01

## 2019-08-01 RX ADMIN — Medication 200 MILLIGRAM(S): at 06:38

## 2019-08-01 RX ADMIN — Medication 0.5 MILLIGRAM(S): at 00:09

## 2019-08-01 NOTE — DISCHARGE NOTE PROVIDER - CARE PROVIDER_API CALL
Rafael Ruiz)  Neuromuscular Medicine  [address] [place] [address], [address]  [place], [place] [id number]  Phone: [phone]  Fax: [phone]  Follow Up Time:

## 2019-08-01 NOTE — PROGRESS NOTE ADULT - ATTENDING COMMENTS
Patient seen and examined independently. Agree with resident note/ history / physical exam and plan of care with following exceptions/additions/updates. Case discussed with house-staff, nursing and patient.  #Progress Note Handoff  Pending (specify):  MRI report  Family discussion: ramya pt   Disposition: Home  Awaiting MRI report, dc home if MRI neg.  time spent on dc  35 min

## 2019-08-01 NOTE — PHYSICAL THERAPY INITIAL EVALUATION ADULT - PASSIVE RANGE OF MOTION EXAMINATION, REHAB EVAL
shoulder wrist and hand limited 25% at end ranges 2/2 pain and spasticity, patient demo stiffness and limited flexibility through cervival spine/Right UE Passive ROM was WFL (within functional limits)/Left LE Passive ROM was WFL (within functional limits)/Right LE Passive ROM was WFL (within functional limits)

## 2019-08-01 NOTE — DISCHARGE NOTE PROVIDER - HOSPITAL COURSE
49 yo F with PMHx of Migraine Headaches, Benign Meningioma s/p craniotomy/resection (2015) and gamma knife radiation (2016) in remission with residual right sided facial sensory deficit and ptosis presents with complaint of sudden onset left sided, throbbing headache with radiation to the neck occurring on the night prior to presentation. Patient denies photo/phonophobia. Patient states this headache was unlike her usual headaches which is normally right sided and burning in quality. Patient then felt LUE numbness as well as weakness. Stroke Code activated in ED, not a tpa candidate. Patient denies chest pain, palpitations, shortness of breath, fever, chills, nausea, vomiting, recent travel, camping, illness, or sick contacts.         - CT Perfusion of Head and Neck unremarkable     -  MRI Brain done and showed     - EEG unremarkable     - 2D-Echo was done, possible PFO, will repeat it with bubble study if MRI is positive for stroke.         observed for 2 days in ED,     stable foe discharge

## 2019-08-01 NOTE — PHYSICAL THERAPY INITIAL EVALUATION ADULT - GENERAL OBSERVATIONS, REHAB EVAL
[phone] Patient encountered semi brumfield in bed + IV lock, NAD receptive to PT, Patient with olf R facial droop

## 2019-08-01 NOTE — PROGRESS NOTE ADULT - ASSESSMENT
ADITHYA YO 48y Female  MRN#: [id number]   CODE STATUS: full code      SUBJECTIVE  Patient is a 48y old Female who presents with a chief complaint of Headache/ Left arm weakness (31 Jul 2019 18:56)  Currently admitted to medicine with the primary diagnosis of Left arm weakness  Today is hospital day 2d, and this morning she is resting comfortably in bed and reports no overnight events.     OBJECTIVE  PAST MEDICAL & SURGICAL HISTORY  Migraine  Meningioma  S/P partial hysterectomy    ALLERGIES:  Trileptal (Other (Severe))    MEDICATIONS:  STANDING MEDICATIONS  chlorhexidine 4% Liquid 1 Application(s) Topical <User Schedule>  enoxaparin Injectable 40 milliGRAM(s) SubCutaneous daily  topiramate 200 milliGRAM(s) Oral two times a day    PRN MEDICATIONS  acetaminophen   Tablet .. 650 milliGRAM(s) Oral every 6 hours PRN      VITAL SIGNS: Last 24 Hours  T(C): 35.9 (01 Aug 2019 08:06), Max: 36.7 (31 Jul 2019 15:00)  T(F): 96.7 (01 Aug 2019 08:06), Max: 98 (31 Jul 2019 15:00)  HR: 63 (01 Aug 2019 08:06) (57 - 79)  BP: 97/49 (01 Aug 2019 08:06) (81/49 - 103/55)  BP(mean): 73 (31 Jul 2019 15:00) (73 - 73)  RR: 18 (01 Aug 2019 08:06) (16 - 19)  SpO2: 99% (01 Aug 2019 08:06) (98% - 99%)    LABS:                        12.5   4.97  )-----------( 152      ( 01 Aug 2019 05:25 )             36.5     08-01    141  |  108  |  20  ----------------------------<  98  4.0   |  23  |  0.7    Ca    9.4      01 Aug 2019 05:25  Mg     2.0     07-31      RADIOLOGY:    PHYSICAL EXAM:    	GENERAL: NAD, well-developed  	HEAD:  Atraumatic, Normocephalic.   	EYES: Right ptosis, conjunctiva and sclera clear  	NECK: Supple, No JVD  	CHEST/LUNG: Clear to auscultation bilaterally; No wheeze  	HEART: Regular rate and rhythm; No murmurs, rubs, or gallops  	ABDOMEN: Soft, Nontender, Nondistended; Bowel sounds present  	EXTREMITIES:  2+ Peripheral Pulses, No clubbing, cyanosis, or edema  	PSYCH: AAOx3  	NEUROLOGY: No aphasia, no dysarthria, LUE strength 3/5, RUE 5/5, decreased sensation over LUE, bilateral lower extremity strength 5/5 and sensation intact,               SKIN: No rashes or lesions      ASSESSMENT & PLAN    49 yo F with PMHx of Migraine Headaches, Benign Meningioma s/p craniotomy/resection (2015) and gamma knife radiation (2016) in remission with residual right sided facial sensory deficit and ptosis presents with complaint of sudden onset left sided, throbbing headache with radiation to the neck occurring on the night prior to presentation. Patient denies photo/phonophobia. Patient states this headache was unlike her usual headaches which is normally right sided and burning in quality. Patient then felt LUE numbness as well as weakness. Stroke Code activated in ED, not a tpa candidate. Patient denies chest pain, palpitations, shortness of breath, fever, chills, nausea, vomiting, recent travel, camping, illness, or sick contacts.     #left upper extremity drift with right facial sensory deficit and weakness since gamma knife procedure in 2016.  - Etiology of symptoms unknown, vascular and epileptiform cause should be ruled out.   - Neurology recommendations noted  - downgrade to 3e   - CT Perfusion of Head and Neck unremarkable   -  MRI Brain done, pending official read   - EEG unremarkable   - 2D-Echo was done, possible PFO, will repeat it with bubble study if MRI is positive for stroke.   - Neuro checks per unit/Q4H  - Seizure precautions  - PT/OT     #History of Meningioma   -Continue Topamax   -F/U Topiramate level in AM    #Active Smoker  -Smoking cessation advised, declines nicotine patch    # Disposition: Home when medically stable.

## 2019-08-01 NOTE — DISCHARGE NOTE PROVIDER - NSDCFUSCHEDAPPT_GEN_ALL_CORE_FT
ADITHYA YO ; 09/09/2019 ; NPP Otolaryng [facility]  ADITHYA YO ; 09/11/2019 ; NPP Neurology [facility]

## 2019-08-01 NOTE — DISCHARGE NOTE PROVIDER - NSDCCPCAREPLAN_GEN_ALL_CORE_FT
PRINCIPAL DISCHARGE DIAGNOSIS  Diagnosis: Left arm weakness  Assessment and Plan of Treatment: continue to follow up with your neurologist as schedulled      SECONDARY DISCHARGE DIAGNOSES  Diagnosis: Headache  Assessment and Plan of Treatment:

## 2019-08-01 NOTE — DISCHARGE NOTE NURSING/CASE MANAGEMENT/SOCIAL WORK - NSDCDPATPORTLINK_GEN_ALL_CORE
You can access the [facility] Patient Portal, offered by [facility], by registering with the following website: [email or web address]

## 2019-08-03 LAB
TOPIRAMATE SERPL-MCNC: 4.3 MCG/ML — SIGNIFICANT CHANGE UP
TOPIRAMATE SERPL-MCNC: 7.1 MCG/ML — SIGNIFICANT CHANGE UP

## 2019-08-05 PROBLEM — G43.909 MIGRAINE, UNSPECIFIED, NOT INTRACTABLE, WITHOUT STATUS MIGRAINOSUS: Chronic | Status: ACTIVE | Noted: 2019-07-30

## 2019-08-05 PROBLEM — D32.9 BENIGN NEOPLASM OF MENINGES, UNSPECIFIED: Chronic | Status: ACTIVE | Noted: 2019-07-30

## 2019-08-08 DIAGNOSIS — F17.210 NICOTINE DEPENDENCE, CIGARETTES, UNCOMPLICATED: ICD-10-CM

## 2019-08-08 DIAGNOSIS — H02.401 UNSPECIFIED PTOSIS OF RIGHT EYELID: ICD-10-CM

## 2019-08-08 DIAGNOSIS — R29.898 OTHER SYMPTOMS AND SIGNS INVOLVING THE MUSCULOSKELETAL SYSTEM: ICD-10-CM

## 2019-08-08 DIAGNOSIS — R51 HEADACHE: ICD-10-CM

## 2019-08-08 DIAGNOSIS — G43.909 MIGRAINE, UNSPECIFIED, NOT INTRACTABLE, WITHOUT STATUS MIGRAINOSUS: ICD-10-CM

## 2019-08-16 ENCOUNTER — APPOINTMENT (OUTPATIENT)
Dept: NEUROLOGY | Facility: CLINIC | Age: 49
End: 2019-08-16
Payer: COMMERCIAL

## 2019-08-16 PROCEDURE — 99215 OFFICE O/P EST HI 40 MIN: CPT

## 2019-08-16 NOTE — PHYSICAL EXAM
[FreeTextEntry1] : NAD.  AOx3.  Intact memory.  Speech fluent, nondysarthric.  CN 2 – 12 Mild right ptosis with divergence on right head tilt with convergence on left head tilt, otherwise normal.  No facial tenderness to palpation or allodynia.  Strength 5/5 RUE, b/l LE.  LUE 3/5 proximal shoulder girdle, distal WF/WE/FF/FE/Pronation/Supination. normal tone, slight winging L scapular. DTRs 2+ throughout.  Plantar response downgoing b/l.  (-) Hoffmans, clonus.  Sensory intact LT/PP, pain, temp, proprioception and vibration.  NL FTN/HKS.  No dysdiadokinesia.  Gait narrow based/NL tandem.

## 2019-08-16 NOTE — ASSESSMENT
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  Pt with recent acute LUE weakness and pain with persistent weakness and negative central w/u, unclear etiology.  Preservation of DTR suggests possible cervical cord process.  Pt has MRI C-spine ordered by PMD.  Lancinating pain at onset s/o possible plexitis particularly with proximal shoulder girdle involvement.  \par \par Plan:\par -  MRI C-spine - ordered by PMD\par - MRI L Brachial Plexus w/w/o sergio\par - LE venous duplex b/l r/o DVT w/ h/o PFO\par - Continue Topamax 200 mg b.i.d.\par - restart Lyrica 50 mg TID\par - EMG/NCS LUE\par - OT\par - RTC in 2 - 4 wks for EMG/NCS\par

## 2019-08-16 NOTE — HISTORY OF PRESENT ILLNESS
[FreeTextEntry1] : Since her last visit, Ms. Chance had an episode of acute L shoulder pain radiating into the LUE with associated persistent LUE weakness started 2 weeks ago.  Had R facial pain the night before and the next day developed lancinating pain with weakness.  Seen by PMD and sent to hospital for further evaluation and was seen by stroke team w/ Dr. Gan.  Stroke w/u including MRI Brain, CTA/Perfusion done and was negative.  Echo w/ evidence of PFO but pt never had LE duplex to evaluate for embolic source.  Pt symptoms were persistent but was discharged with home OT.  Since discharge, symptoms are still persistent with significant proximal and distal weakness. Denies any neck pain or symptoms in the LLE.  Was thought to have facial asymmetry and LLE weakness with LUE weakness while in ER but facial asymmetry chronic from prior surgeries and LLE asymptomatic per patient.  Denies any numbness currently but has some parethesias in the distal fingertips that developed several days afterwards.  Denies any shoulder pain or axillary pain currently.  \par \par Of note, since her last visit she had a followup in May 2019 with radiation surgery at [facility] for recurrent R trigeminal neuralgia.  PT was referred to neurosurgery for possible rhizotomy since unable to perform any additional gamma-knife treatments at this time.  Was also recommended to restart additional medications if needed.  Pt has not made appointment for rhizotomy since does not want additional surgery at this time.

## 2019-08-22 ENCOUNTER — MEDICATION RENEWAL (OUTPATIENT)
Age: 49
End: 2019-08-22

## 2019-09-09 ENCOUNTER — APPOINTMENT (OUTPATIENT)
Dept: OTOLARYNGOLOGY | Facility: CLINIC | Age: 49
End: 2019-09-09

## 2019-09-11 ENCOUNTER — APPOINTMENT (OUTPATIENT)
Dept: NEUROLOGY | Facility: CLINIC | Age: 49
End: 2019-09-11

## 2019-09-12 ENCOUNTER — TRANSCRIPTION ENCOUNTER (OUTPATIENT)
Age: 49
End: 2019-09-12

## 2019-09-12 ENCOUNTER — APPOINTMENT (OUTPATIENT)
Dept: NEUROLOGY | Facility: CLINIC | Age: 49
End: 2019-09-12
Payer: COMMERCIAL

## 2019-09-12 PROCEDURE — 99214 OFFICE O/P EST MOD 30 MIN: CPT | Mod: 25

## 2019-09-12 PROCEDURE — 95886 MUSC TEST DONE W/N TEST COMP: CPT

## 2019-09-12 PROCEDURE — 95910 NRV CNDJ TEST 7-8 STUDIES: CPT

## 2019-09-12 RX ORDER — PREGABALIN 50 MG/1
50 CAPSULE ORAL TWICE DAILY
Qty: 180 | Refills: 1 | Status: DISCONTINUED | COMMUNITY
Start: 2019-08-16 | End: 2019-09-12

## 2019-09-12 NOTE — HISTORY OF PRESENT ILLNESS
[FreeTextEntry1] : Since her last visit, Ms. Chance continues to have pain in the neck base and L trapezius with occasional weakness in the  but not persistent.  States pain with movement particularly in the L shoulder nonradiating.  No atrophy or fasciculations.  No lancinating pain in the arms.  MRI Brachial plexus denied by insurance.  MRI c-spine completed.  \par \par Despite starting lyrica 50 mg TID, R TGN continues to plague her with recent severe exacerbation lasting 4 days with severe allodynia and inability to chew or speak resulting in 5 lbs weight loss.  Continues to take topamax 200 mg BID.  Lyrica made her more groggy but with no effect on lancinating pain.  Has not followed up with neurosurgery at [facility] and has expressed frustration with recurrence of symptoms despite recent gamma-knife treatments.  \par \par Has tried cymbalta, depakote, trileptal, tegretol and lamictal in the past.  Had some response to cymbalta but all other treatments have failed.  Has had good effect with treximet for migraine in the past.  Has never tried TCAs in past for neuralgia.

## 2019-09-12 NOTE — PHYSICAL EXAM
[FreeTextEntry1] : NAD.  AOx3.  Intact memory.  Speech fluent, nondysarthric.  CN 2 – 12 Mild right ptosis with divergence on right head tilt with convergence on left head tilt, otherwise normal.  No facial tenderness to palpation or allodynia.  Strength 5/5 RUE, b/l LE.  LUE 3/5 proximal shoulder girdle, distal WF/WE/FF/FE/Pronation/Supination. normal, no scapular winging. DTRs 2+ throughout.  Plantar response downgoing b/l.  (-) Hoffmans, clonus.  Sensory intact LT/PP, pain, temp, proprioception and vibration.  NL FTN/HKS.  No dysdiadokinesia.  Gait narrow based/NL tandem.

## 2019-09-12 NOTE — ASSESSMENT
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  LUE pain and weakness suspect musculoskeletal and possible rotator cuff injury with pain on movement and relatively stable MRI C-spine with normal EMG.  Pt continues to have intractable neuralgiform pain therefore recommend d/c lyrica and start nortriptyline.  recommend restart treximet for migraine and baclofen for breakthrough neuralgia.  Will refer to neurosurgery Dr. Gaxiola for reassessment. \par \par Plan:\par - Continue Topamax 200 mg b.i.d.\par - d/c Lyrica 50 mg TID\par - start nortriptyline 10 mg QD x 1 week then increase to 20 mg QHS\par - Baclofen 10 mg TID PRN, Treximet 1 tb PO PRN\par - RTC in 3 months\par - neurosurgery evaluation with Dr. Gaxiola\par

## 2019-09-25 ENCOUNTER — APPOINTMENT (OUTPATIENT)
Dept: NEUROLOGY | Facility: CLINIC | Age: 49
End: 2019-09-25

## 2019-10-28 ENCOUNTER — APPOINTMENT (OUTPATIENT)
Dept: NEUROSURGERY | Facility: CLINIC | Age: 49
End: 2019-10-28
Payer: COMMERCIAL

## 2019-10-28 DIAGNOSIS — Z86.59 PERSONAL HISTORY OF OTHER MENTAL AND BEHAVIORAL DISORDERS: ICD-10-CM

## 2019-10-28 DIAGNOSIS — G93.9 DISORDER OF BRAIN, UNSPECIFIED: ICD-10-CM

## 2019-10-28 DIAGNOSIS — Z78.9 OTHER SPECIFIED HEALTH STATUS: ICD-10-CM

## 2019-10-28 DIAGNOSIS — Z87.09 PERSONAL HISTORY OF OTHER DISEASES OF THE RESPIRATORY SYSTEM: ICD-10-CM

## 2019-10-28 DIAGNOSIS — Z87.39 PERSONAL HISTORY OF OTHER DISEASES OF THE MUSCULOSKELETAL SYSTEM AND CONNECTIVE TISSUE: ICD-10-CM

## 2019-10-28 DIAGNOSIS — Z87.898 PERSONAL HISTORY OF OTHER SPECIFIED CONDITIONS: ICD-10-CM

## 2019-10-28 DIAGNOSIS — R29.898 OTHER SYMPTOMS AND SIGNS INVOLVING THE MUSCULOSKELETAL SYSTEM: ICD-10-CM

## 2019-10-28 PROCEDURE — 99203 OFFICE O/P NEW LOW 30 MIN: CPT

## 2019-10-29 PROBLEM — Z86.59 HISTORY OF ANXIETY: Status: RESOLVED | Noted: 2019-10-28 | Resolved: 2019-10-29

## 2019-10-29 PROBLEM — G93.9 BRAIN LESION: Status: RESOLVED | Noted: 2019-10-29 | Resolved: 2019-10-29

## 2019-10-29 PROBLEM — Z87.898 HISTORY OF PARESTHESIA: Status: RESOLVED | Noted: 2019-05-30 | Resolved: 2019-10-29

## 2019-10-29 PROBLEM — R29.898 LEFT ARM WEAKNESS: Status: RESOLVED | Noted: 2019-08-16 | Resolved: 2019-10-29

## 2019-10-29 PROBLEM — Z87.39 HISTORY OF OSTEOPOROSIS: Status: RESOLVED | Noted: 2019-10-28 | Resolved: 2019-10-29

## 2019-10-29 PROBLEM — Z78.9 CURRENT NON-DRINKER OF ALCOHOL: Status: ACTIVE | Noted: 2019-10-28

## 2019-10-29 PROBLEM — Z87.09 HISTORY OF ASTHMA: Status: RESOLVED | Noted: 2019-10-28 | Resolved: 2019-10-29

## 2019-10-29 PROBLEM — Z86.59 HISTORY OF DEPRESSION: Status: RESOLVED | Noted: 2019-10-28 | Resolved: 2019-10-29

## 2019-10-29 RX ORDER — NORTRIPTYLINE HYDROCHLORIDE 10 MG/1
10 CAPSULE ORAL AT BEDTIME
Qty: 60 | Refills: 5 | Status: DISCONTINUED | COMMUNITY
Start: 2019-09-12 | End: 2019-10-29

## 2019-10-29 NOTE — DATA REVIEWED
[de-identified] : \par - MRI brain with/without gado + IAC, 8/6/18: postoperative changes noted. No evidence of recurrent tumor. \par \par - MRI brain with/without gado + IAC, 7/20/17: There was no evidence of residual or recurrent tumor. Per the report there was abnormal contrast enhancement of the right trigeminal nerve.

## 2019-10-29 NOTE — ASSESSMENT
[FreeTextEntry1] : We have had a thorough discussion regarding her current condition, findings, and treatment options. We discussed the options of trialing a Glycerol injections RFA, repeat Gamma Knife, and possibly a Trigeminal Nerve Stimulator. She did inform me that she has consulted with the specialists at [facility] who did not feel she was a candidate for the above options. Since her trigeminal neuralgia symptoms have been refractory to various treatments, I have recommended that she consult with Dr. Mckenna regarding medical marijuana. She is interested in considering this option. I will see her back in the office as needed.

## 2019-10-29 NOTE — PHYSICAL EXAM
[General Appearance - Alert] : alert [Oriented To Time, Place, And Person] : oriented to person, place, and time [Affect] : the affect was normal [Motor Tone] : muscle tone was normal in all four extremities [Motor Strength] : muscle strength was normal in all four extremities [Proprioception] : proprioception was intact [Abnormal Walk] : normal gait [Balance] : balance was intact [No Pain with ROM] : no pain with motion in any direction [Normal] : normal [Intact] : all reflexes within normal limits bilaterally [Hearing Threshold Finger Rub Not Tripp] : hearing was normal [Neck Appearance] : the appearance of the neck was normal [] : no respiratory distress [Involuntary Movements] : no involuntary movements were seen [Skin Color & Pigmentation] : normal skin color and pigmentation [FreeTextEntry1] : Mild distress [FreeTextEntry5] : Right eye ptosis. Numbness to the right side of the face.  [FreeTextEntry6] : Mild pain with ROM of the left shoulder.

## 2019-10-29 NOTE — HISTORY OF PRESENT ILLNESS
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 Villa in 2015. Per the operative report, which was reviewed today, the lesion was large with a calcified base. Prior to surgery she had developed acute right sided facial pain with trigeminal neuralgia symptoms in 2014. 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  (2.5 cm in size). \par \par To date, she continues to experience right sided facial numbness and persistent trigeminal neuralgia symptoms. This pain is constant. She describes it as sharp, throbbing, shooting, and electrical at times. She has trialed various to attempt to control her symptoms to no avail. She had Gamma Knife 2 years ago which initially provided some mild relief, however, shortly after the pain returned. She did consult with Dr. Villa and Dr. Romero regarding further treatments. She now presents today, referred by her neurologist, Dr. Ruiz to discuss potential treatment options.  [FreeTextEntry3] : Chewing / The cold weather

## 2019-11-22 ENCOUNTER — MEDICATION RENEWAL (OUTPATIENT)
Age: 49
End: 2019-11-22

## 2019-12-02 ENCOUNTER — RX RENEWAL (OUTPATIENT)
Age: 49
End: 2019-12-02

## 2020-01-08 ENCOUNTER — APPOINTMENT (OUTPATIENT)
Dept: NEUROLOGY | Facility: CLINIC | Age: 50
End: 2020-01-08

## 2020-01-15 ENCOUNTER — APPOINTMENT (OUTPATIENT)
Dept: NEUROLOGY | Facility: CLINIC | Age: 50
End: 2020-01-15

## 2020-07-09 ENCOUNTER — TRANSCRIPTION ENCOUNTER (OUTPATIENT)
Age: 50
End: 2020-07-09

## 2020-07-23 ENCOUNTER — APPOINTMENT (OUTPATIENT)
Dept: NEUROLOGY | Facility: CLINIC | Age: 50
End: 2020-07-23
Payer: COMMERCIAL

## 2020-07-23 VITALS
DIASTOLIC BLOOD PRESSURE: 68 MMHG | HEIGHT: 71 IN | HEART RATE: 76 BPM | SYSTOLIC BLOOD PRESSURE: 105 MMHG | TEMPERATURE: 98.2 F | WEIGHT: 120 LBS | OXYGEN SATURATION: 99 % | BODY MASS INDEX: 16.8 KG/M2

## 2020-07-23 PROCEDURE — 99215 OFFICE O/P EST HI 40 MIN: CPT

## 2020-07-23 NOTE — HISTORY OF PRESENT ILLNESS
[FreeTextEntry1] : Since her last visit, Ms. Chance had seen Dr. Gaxiola and was recommended to go back to specialists at [facility] for additional glycerol or gamma-knife treatments.  Was advised to seek medical marijuana for pain relief which she has not done.  Lyrica has helped but still having significant breakthrough pain at times requiring baclofen 2x per week. Baseline pain 5/10 with 10/10 during exacerbations with allodynia.  Recently had noted squeezin pain retroorbitally OS with burning pain and hypersensitivity in the R nostril at times associated with TGN.  Tolerates medications well but lyrica does make her more lethargic.  Denies any new focal deficits and has not followed up with surgeons at [facility] due to pandemic.

## 2020-07-23 NOTE — ASSESSMENT
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  \par \par Plan:\par - Continue Topamax 200 mg b.i.d.\par - increase Lyrica to 150 mg BID\par -  Baclofen 10 mg TID PRN, may repeat x 1 s/p 30 mins if no efficacy\par - repeat MRI Brain w/w/o gado r/o recurrence of meningioma\par - f/u w/ neurosurgeons at [facility]\par - will contact pt w/ MRI results if new abnormalities\par - RTC in 3 months\par

## 2020-09-03 ENCOUNTER — TRANSCRIPTION ENCOUNTER (OUTPATIENT)
Age: 50
End: 2020-09-03

## 2020-09-18 ENCOUNTER — INPATIENT (INPATIENT)
Facility: HOSPITAL | Age: 50
LOS: 0 days | Discharge: LEFT BEFORE DISCHARGE | End: 2020-09-19
Attending: INTERNAL MEDICINE | Admitting: INTERNAL MEDICINE
Payer: COMMERCIAL

## 2020-09-18 ENCOUNTER — TRANSCRIPTION ENCOUNTER (OUTPATIENT)
Age: 50
End: 2020-09-18

## 2020-09-18 VITALS
SYSTOLIC BLOOD PRESSURE: 100 MMHG | RESPIRATION RATE: 18 BRPM | TEMPERATURE: 99 F | DIASTOLIC BLOOD PRESSURE: 54 MMHG | OXYGEN SATURATION: 99 % | HEART RATE: 88 BPM

## 2020-09-18 DIAGNOSIS — A09 INFECTIOUS GASTROENTERITIS AND COLITIS, UNSPECIFIED: ICD-10-CM

## 2020-09-18 DIAGNOSIS — M79.10 MYALGIA, UNSPECIFIED SITE: ICD-10-CM

## 2020-09-18 DIAGNOSIS — M00.9 PYOGENIC ARTHRITIS, UNSPECIFIED: ICD-10-CM

## 2020-09-18 DIAGNOSIS — G43.909 MIGRAINE, UNSPECIFIED, NOT INTRACTABLE, WITHOUT STATUS MIGRAINOSUS: ICD-10-CM

## 2020-09-18 DIAGNOSIS — G50.0 TRIGEMINAL NEURALGIA: ICD-10-CM

## 2020-09-18 DIAGNOSIS — Z90.711 ACQUIRED ABSENCE OF UTERUS WITH REMAINING CERVICAL STUMP: Chronic | ICD-10-CM

## 2020-09-18 DIAGNOSIS — Z88.8 ALLERGY STATUS TO OTHER DRUGS, MEDICAMENTS AND BIOLOGICAL SUBSTANCES: ICD-10-CM

## 2020-09-18 DIAGNOSIS — R10.9 UNSPECIFIED ABDOMINAL PAIN: ICD-10-CM

## 2020-09-18 DIAGNOSIS — Z87.891 PERSONAL HISTORY OF NICOTINE DEPENDENCE: ICD-10-CM

## 2020-09-18 DIAGNOSIS — Z90.710 ACQUIRED ABSENCE OF BOTH CERVIX AND UTERUS: ICD-10-CM

## 2020-09-18 LAB
ALBUMIN SERPL ELPH-MCNC: 4.3 G/DL — SIGNIFICANT CHANGE UP (ref 3.5–5.2)
ALP SERPL-CCNC: 57 U/L — SIGNIFICANT CHANGE UP (ref 30–115)
ALT FLD-CCNC: 14 U/L — SIGNIFICANT CHANGE UP (ref 0–41)
ANION GAP SERPL CALC-SCNC: 10 MMOL/L — SIGNIFICANT CHANGE UP (ref 7–14)
APPEARANCE UR: CLEAR — SIGNIFICANT CHANGE UP
APTT BLD: 21.6 SEC — CRITICAL LOW (ref 27–39.2)
AST SERPL-CCNC: 32 U/L — SIGNIFICANT CHANGE UP (ref 0–41)
BASOPHILS # BLD AUTO: 0.03 K/UL — SIGNIFICANT CHANGE UP (ref 0–0.2)
BASOPHILS NFR BLD AUTO: 0.6 % — SIGNIFICANT CHANGE UP (ref 0–1)
BILIRUB SERPL-MCNC: 0.6 MG/DL — SIGNIFICANT CHANGE UP (ref 0.2–1.2)
BILIRUB UR-MCNC: NEGATIVE — SIGNIFICANT CHANGE UP
BUN SERPL-MCNC: 9 MG/DL — LOW (ref 10–20)
CALCIUM SERPL-MCNC: 9.7 MG/DL — SIGNIFICANT CHANGE UP (ref 8.5–10.1)
CHLORIDE SERPL-SCNC: 106 MMOL/L — SIGNIFICANT CHANGE UP (ref 98–110)
CK SERPL-CCNC: 111 U/L — SIGNIFICANT CHANGE UP (ref 0–225)
CO2 SERPL-SCNC: 24 MMOL/L — SIGNIFICANT CHANGE UP (ref 17–32)
COLOR SPEC: SIGNIFICANT CHANGE UP
CREAT SERPL-MCNC: 0.7 MG/DL — SIGNIFICANT CHANGE UP (ref 0.7–1.5)
DIFF PNL FLD: NEGATIVE — SIGNIFICANT CHANGE UP
EOSINOPHIL # BLD AUTO: 0.1 K/UL — SIGNIFICANT CHANGE UP (ref 0–0.7)
EOSINOPHIL NFR BLD AUTO: 1.8 % — SIGNIFICANT CHANGE UP (ref 0–8)
GLUCOSE SERPL-MCNC: 88 MG/DL — SIGNIFICANT CHANGE UP (ref 70–99)
GLUCOSE UR QL: NEGATIVE — SIGNIFICANT CHANGE UP
HCT VFR BLD CALC: 36 % — LOW (ref 37–47)
HGB BLD-MCNC: 12.2 G/DL — SIGNIFICANT CHANGE UP (ref 12–16)
IMM GRANULOCYTES NFR BLD AUTO: 0.4 % — HIGH (ref 0.1–0.3)
INR BLD: 0.98 RATIO — SIGNIFICANT CHANGE UP (ref 0.65–1.3)
KETONES UR-MCNC: NEGATIVE — SIGNIFICANT CHANGE UP
LACTATE SERPL-SCNC: 0.7 MMOL/L — SIGNIFICANT CHANGE UP (ref 0.7–2)
LEUKOCYTE ESTERASE UR-ACNC: NEGATIVE — SIGNIFICANT CHANGE UP
LIDOCAIN IGE QN: 34 U/L — SIGNIFICANT CHANGE UP (ref 7–60)
LYMPHOCYTES # BLD AUTO: 1.42 K/UL — SIGNIFICANT CHANGE UP (ref 1.2–3.4)
LYMPHOCYTES # BLD AUTO: 26.1 % — SIGNIFICANT CHANGE UP (ref 20.5–51.1)
MAGNESIUM SERPL-MCNC: 2 MG/DL — SIGNIFICANT CHANGE UP (ref 1.8–2.4)
MCHC RBC-ENTMCNC: 30.1 PG — SIGNIFICANT CHANGE UP (ref 27–31)
MCHC RBC-ENTMCNC: 33.9 G/DL — SIGNIFICANT CHANGE UP (ref 32–37)
MCV RBC AUTO: 88.9 FL — SIGNIFICANT CHANGE UP (ref 81–99)
MONOCYTES # BLD AUTO: 0.45 K/UL — SIGNIFICANT CHANGE UP (ref 0.1–0.6)
MONOCYTES NFR BLD AUTO: 8.3 % — SIGNIFICANT CHANGE UP (ref 1.7–9.3)
NEUTROPHILS # BLD AUTO: 3.42 K/UL — SIGNIFICANT CHANGE UP (ref 1.4–6.5)
NEUTROPHILS NFR BLD AUTO: 62.8 % — SIGNIFICANT CHANGE UP (ref 42.2–75.2)
NITRITE UR-MCNC: NEGATIVE — SIGNIFICANT CHANGE UP
NRBC # BLD: 0 /100 WBCS — SIGNIFICANT CHANGE UP (ref 0–0)
PH UR: 7.5 — SIGNIFICANT CHANGE UP (ref 5–8)
PLATELET # BLD AUTO: 218 K/UL — SIGNIFICANT CHANGE UP (ref 130–400)
POTASSIUM SERPL-MCNC: 5 MMOL/L — SIGNIFICANT CHANGE UP (ref 3.5–5)
POTASSIUM SERPL-SCNC: 5 MMOL/L — SIGNIFICANT CHANGE UP (ref 3.5–5)
PROT SERPL-MCNC: 6.6 G/DL — SIGNIFICANT CHANGE UP (ref 6–8)
PROT UR-MCNC: NEGATIVE — SIGNIFICANT CHANGE UP
PROTHROM AB SERPL-ACNC: 11.3 SEC — SIGNIFICANT CHANGE UP (ref 9.95–12.87)
RBC # BLD: 4.05 M/UL — LOW (ref 4.2–5.4)
RBC # FLD: 12.7 % — SIGNIFICANT CHANGE UP (ref 11.5–14.5)
SODIUM SERPL-SCNC: 140 MMOL/L — SIGNIFICANT CHANGE UP (ref 135–146)
SP GR SPEC: 1.03 — SIGNIFICANT CHANGE UP (ref 1.01–1.03)
UROBILINOGEN FLD QL: SIGNIFICANT CHANGE UP
WBC # BLD: 5.44 K/UL — SIGNIFICANT CHANGE UP (ref 4.8–10.8)
WBC # FLD AUTO: 5.44 K/UL — SIGNIFICANT CHANGE UP (ref 4.8–10.8)

## 2020-09-18 PROCEDURE — 99285 EMERGENCY DEPT VISIT HI MDM: CPT

## 2020-09-18 PROCEDURE — 99222 1ST HOSP IP/OBS MODERATE 55: CPT

## 2020-09-18 PROCEDURE — 74177 CT ABD & PELVIS W/CONTRAST: CPT | Mod: 26

## 2020-09-18 RX ORDER — DULOXETINE HYDROCHLORIDE 30 MG/1
200 CAPSULE, DELAYED RELEASE ORAL
Qty: 0 | Refills: 0 | DISCHARGE

## 2020-09-18 RX ORDER — SODIUM CHLORIDE 9 MG/ML
1000 INJECTION INTRAMUSCULAR; INTRAVENOUS; SUBCUTANEOUS ONCE
Refills: 0 | Status: COMPLETED | OUTPATIENT
Start: 2020-09-18 | End: 2020-09-18

## 2020-09-18 RX ORDER — ALPRAZOLAM 0.25 MG
1 TABLET ORAL
Qty: 0 | Refills: 0 | DISCHARGE

## 2020-09-18 RX ORDER — ACETAMINOPHEN 500 MG
650 TABLET ORAL ONCE
Refills: 0 | Status: COMPLETED | OUTPATIENT
Start: 2020-09-18 | End: 2020-09-18

## 2020-09-18 RX ORDER — ALPRAZOLAM 0.25 MG
1 TABLET ORAL AT BEDTIME
Refills: 0 | Status: DISCONTINUED | OUTPATIENT
Start: 2020-09-18 | End: 2020-09-19

## 2020-09-18 RX ORDER — MORPHINE SULFATE 50 MG/1
4 CAPSULE, EXTENDED RELEASE ORAL ONCE
Refills: 0 | Status: DISCONTINUED | OUTPATIENT
Start: 2020-09-18 | End: 2020-09-18

## 2020-09-18 RX ORDER — BACLOFEN 100 %
10 POWDER (GRAM) MISCELLANEOUS THREE TIMES A DAY
Refills: 0 | Status: DISCONTINUED | OUTPATIENT
Start: 2020-09-18 | End: 2020-09-19

## 2020-09-18 RX ORDER — DULOXETINE HYDROCHLORIDE 30 MG/1
20 CAPSULE, DELAYED RELEASE ORAL DAILY
Refills: 0 | Status: DISCONTINUED | OUTPATIENT
Start: 2020-09-18 | End: 2020-09-19

## 2020-09-18 RX ORDER — MORPHINE SULFATE 50 MG/1
2 CAPSULE, EXTENDED RELEASE ORAL ONCE
Refills: 0 | Status: DISCONTINUED | OUTPATIENT
Start: 2020-09-18 | End: 2020-09-18

## 2020-09-18 RX ORDER — ALPRAZOLAM 0.25 MG
0 TABLET ORAL
Qty: 0 | Refills: 0 | DISCHARGE

## 2020-09-18 RX ORDER — PANTOPRAZOLE SODIUM 20 MG/1
40 TABLET, DELAYED RELEASE ORAL
Refills: 0 | Status: DISCONTINUED | OUTPATIENT
Start: 2020-09-18 | End: 2020-09-19

## 2020-09-18 RX ORDER — CHLORHEXIDINE GLUCONATE 213 G/1000ML
1 SOLUTION TOPICAL DAILY
Refills: 0 | Status: DISCONTINUED | OUTPATIENT
Start: 2020-09-18 | End: 2020-09-19

## 2020-09-18 RX ORDER — ENOXAPARIN SODIUM 100 MG/ML
40 INJECTION SUBCUTANEOUS DAILY
Refills: 0 | Status: DISCONTINUED | OUTPATIENT
Start: 2020-09-18 | End: 2020-09-19

## 2020-09-18 RX ORDER — TOPIRAMATE 25 MG
200 TABLET ORAL
Refills: 0 | Status: DISCONTINUED | OUTPATIENT
Start: 2020-09-18 | End: 2020-09-19

## 2020-09-18 RX ORDER — BACLOFEN 100 %
1 POWDER (GRAM) MISCELLANEOUS
Qty: 0 | Refills: 0 | DISCHARGE

## 2020-09-18 RX ADMIN — MORPHINE SULFATE 2 MILLIGRAM(S): 50 CAPSULE, EXTENDED RELEASE ORAL at 15:46

## 2020-09-18 RX ADMIN — SODIUM CHLORIDE 1000 MILLILITER(S): 9 INJECTION INTRAMUSCULAR; INTRAVENOUS; SUBCUTANEOUS at 15:46

## 2020-09-18 RX ADMIN — Medication 40 MILLIGRAM(S): at 20:12

## 2020-09-18 RX ADMIN — Medication 650 MILLIGRAM(S): at 15:00

## 2020-09-18 RX ADMIN — MORPHINE SULFATE 4 MILLIGRAM(S): 50 CAPSULE, EXTENDED RELEASE ORAL at 17:36

## 2020-09-18 RX ADMIN — MORPHINE SULFATE 2 MILLIGRAM(S): 50 CAPSULE, EXTENDED RELEASE ORAL at 16:16

## 2020-09-18 RX ADMIN — MORPHINE SULFATE 4 MILLIGRAM(S): 50 CAPSULE, EXTENDED RELEASE ORAL at 17:51

## 2020-09-18 RX ADMIN — Medication 500 MILLIGRAM(S): at 21:17

## 2020-09-18 RX ADMIN — SODIUM CHLORIDE 1000 MILLILITER(S): 9 INJECTION INTRAMUSCULAR; INTRAVENOUS; SUBCUTANEOUS at 15:56

## 2020-09-18 RX ADMIN — Medication 650 MILLIGRAM(S): at 15:30

## 2020-09-18 NOTE — ED PROVIDER NOTE - OBJECTIVE STATEMENT
49y/o F with PMH of migraine, meningioma s/p resection 2015, hx of hysterectomy presents to ED with abd pain, leg pain, R arm pains. On Sat went to party, after had diarrhea, thought she ate something bad. Sun had some vomiting and pressure in head, thought it was her migraines. By Tues felt improved, but on Wed after eating again had diarrhea even w/ immodium. Today woke up in AM with severe 10/10 mostly lower cramping deep achy abd pain, but diffuse, w/ radiation around hips and to buttocks. Also has severe achy muscle pains b/l LE from hips down legs to knees, worse on walking and palpation. Similar pain in RUE from wrist to shoulder. No hx of trauma, inc headaches, diarrhea/nausea/vomiting, blood in stool/urine, jt swelling, rashes, urinary sxs, CP, SOB, fever/chills. Saw UC during this time and they referred her to ED. No hx of similar complaints.

## 2020-09-18 NOTE — H&P ADULT - ATTENDING COMMENTS
I saw and evaluated the patient. I have reviewed and agree with the findings and plan of care as documented above in the resident’s note (unless indicated differently below). Any necessary changes were made in the body of the text. I saw and evaluated the patient. I have reviewed and agree with the findings and plan of care as documented above in the resident’s note (unless indicated differently below). Any necessary changes were made in the body of the text.    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 I have reviewed and agree with the findings and plan of care as documented above in the resident’s note (unless indicated differently below). Any necessary changes were made in the body of the text.    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

## 2020-09-18 NOTE — ED PROVIDER NOTE - CLINICAL SUMMARY MEDICAL DECISION MAKING FREE TEXT BOX
49 yo female with resolving diarrhea now presenting with abdominal and extremity pain,  Afebrile, CT scan abd/pelvis negative for acute pathology, pt has difficulties ambulating due to pain, admit for further work up and management.

## 2020-09-18 NOTE — ED PROVIDER NOTE - PHYSICAL EXAMINATION
GENERAL: tired F in mild painful distress, sitting holding her stomach   HEENT:  Atraumatic, Normocephalic; EOMI, PERRLA, conjunctiva pink, sclera white, dry oral mucosa, Supple w/o neck tenderness, small lymph nodes submandibular and angle of jaw, mildly tender; no pharyngeal edema/erythema  CHEST/LUNG: Clear to auscultation bilaterally; No wheeze  HEART: Regular rate and rhythm; No murmurs  ABDOMEN: diffusely tender but worse in suprapubic area, soft, no peritoneal signs, Nondistended; Bowel sounds hyperactive  EXTREMITIES:  No peripheral pitting edema; diffuse tenderness to palpation over b/l LE proximal legs and arm (no dec ROM of knees, shoulder, wrists but endorses pain on movement), no jt swelling/erythema/deformity  PSYCH: AAOx3  NEUROLOGY: R face numb, inability to lift R eyelid (both are baseline), strength and FROM b/l all intact   SKIN: No rashes/lesions appreciated

## 2020-09-18 NOTE — H&P ADULT - ASSESSMENT
50 year old Female with Past Medical History of migraine, meningioma s/p resection 2015, hx of hysterectomy, trigeminal neuralgia presents to ED with Lower abdominal and multiple joint pains.    Patient had an episode of diarrhea a week ago and now presents with myalgias, oligoarthritis with negative CT Abdomen for any pathology. Negative Creatinine Kinase with low PTT. Will get inflammatory markers and meanwhile will treat with Naproxen and Prednisone on the lines of possible reactive arthritis. Abdominal pain could just be a referred pain from the lower back pain and the hip pain or could be an independent pathology.    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  # Abdominal Pain possibly referred pain from joint pain vs viral infection  - Reports Knee, hip, shoulder, lower back pain, tender to palpation with surrounding muscle pain  - Normal white count, low PTT, normal electrolytes, normal creatinine kinase on labs  - Myopathy unlikely given lack of Creatinine Kinase and no generalized muscle pain  - s/p Morphine in ED  - CT Abdomen and Pelvis negative for any acute pathology  - Will treat with Naproxen 500 three times a day and Prednisone 20 daily  - Will get HLA B27, ESR, CRP and Procalcitonin  - Will get GI PCR  - Will get Neuro consult  - No indication for imaging, synovial analysis  - Can consider Rheumatology if no improvement    # Trigeminal Neuralgia  - Continue Baclofen PRN    # Migraines  - Continue Topiramate    DVT: Lovenox  GI: Pantoprazole  Activity: Ambulate as tolerated  Dispo: Pending workup 50 year old Female with Past Medical History of migraine, meningioma s/p resection 2015, hx of hysterectomy, trigeminal neuralgia presents to ED with Lower abdominal and multiple joint pains.    Patient had an episode of diarrhea a week ago and now presents with myalgias, oligoarthritis with negative CT Abdomen for any pathology. Negative Creatinine Kinase with low PTT which normally can be indicator of inflammation. Will get inflammatory markers and meanwhile will treat with Naproxen and Prednisone on the lines of possible reactive arthritis. Abdominal pain could just be a referred pain from the lower back pain and the hip pain or could be an independent pathology.    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  # Abdominal Pain possibly referred pain from joint pain vs viral infection  - Reports Knee, hip, shoulder, lower back pain, tender to palpation with surrounding muscle pain  - Normal white count, low PTT, normal electrolytes, normal creatinine kinase on labs  - Myopathy unlikely given lack of Creatinine Kinase and no generalized muscle pain  - Low PTT could be indication of inflammation  - s/p Morphine in ED  - CT Abdomen and Pelvis negative for any acute pathology  - Will treat with Naproxen 500 three times a day and Prednisone 20 daily  - Will get HLA B27, ESR, CRP and Procalcitonin  - Will get GI PCR  - Will get Neuro consult  - No indication for imaging, synovial analysis  - Can consider Rheumatology if no improvement    # Trigeminal Neuralgia  - Continue Baclofen PRN    # Migraines  - Continue Topiramate    DVT: Lovenox  GI: Pantoprazole  Activity: Ambulate as tolerated  Dispo: Pending workup 50 year old Female with Past Medical History of migraine, meningioma s/p resection 2015, hx of hysterectomy, trigeminal neuralgia presents to ED with Lower abdominal and multiple joint pains.    Patient had an episode of diarrhea a week ago and now presents with myalgias, oligoarthritis with negative CT Abdomen for any pathology. Negative Creatinine Kinase with low PTT which normally can be indicator of inflammation. Will get inflammatory markers and meanwhile will treat with Naproxen and Prednisone on the lines of possible reactive arthritis. Abdominal pain could just be a referred pain from the lower back pain and the hip pain or could be an independent pathology.    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  # Abdominal Pain possibly referred pain from joint pain vs viral infection  - Reports Knee, hip, shoulder, lower back pain, tender to palpation with surrounding muscle pain  - Normal white count, low PTT, normal electrolytes, normal creatinine kinase on labs  - Myopathy unlikely given lack of Creatinine Kinase and no generalized muscle pain, low risk for STD  - Low PTT could be indication of inflammation  - s/p Morphine in ED  - CT Abdomen and Pelvis negative for any acute pathology  - Will treat with Naproxen 500 three times a day and Prednisone 20 daily  - Will get HLA B27, ESR, CRP and Procalcitonin, UA, Neisseria and Gonorrhea Urine  - Will get GI PCR  - Will get Neuro consult  - No indication for imaging, synovial analysis  - Can consider Rheumatology if no improvement    # Trigeminal Neuralgia  - Continue Baclofen PRN    # Migraines  - Continue Topiramate    DVT: Lovenox  GI: Pantoprazole  Activity: Ambulate as tolerated  Dispo: Pending workup 50 year old Female with Past Medical History of migraine, meningioma s/p resection 2015, hx of hysterectomy, trigeminal neuralgia presents to ED with Lower abdominal and multiple joint pains.    Patient had an episode of diarrhea a week ago and now presents with myalgias, oligoarthritis with negative CT Abdomen for any pathology. Negative Creatinine Kinase with low PTT which normally can be indicator of inflammation. Will get inflammatory markers and meanwhile will treat with Naproxen and Prednisone on the lines of possible reactive arthritis. Abdominal pain could just be a referred pain from the lower back pain and the hip pain or could be an independent pathology.    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  # Abdominal Pain possibly referred pain from joint pain vs viral infection  - Reports Knee, hip, shoulder, lower back pain, tender to palpation with surrounding muscle pain  - Normal white count, low PTT, normal electrolytes, normal creatinine kinase on labs  - Myopathy unlikely given lack of Creatinine Kinase and no generalized muscle pain, low risk for STD  - Low PTT could be indication of inflammation  - s/p Morphine in ED  - CT Abdomen and Pelvis negative for any acute pathology  - Will treat with Naproxen 500 three times a day and Prednisone 20 daily  - Will get HLA B27, ESR, CRP and Procalcitonin, UA, Neisseria and Gonorrhea Urine  - Will get GI PCR  - No indication for imaging, synovial analysis  - Can consider Rheumatology if no improvement    # Trigeminal Neuralgia  - Continue Baclofen PRN    # Migraines  - Continue Topiramate    DVT: Lovenox  GI: Pantoprazole  Activity: Ambulate as tolerated  Dispo: Pending workup

## 2020-09-18 NOTE — H&P ADULT - HISTORY OF PRESENT ILLNESS
50 year old Female with Past Medical History of migraine, meningioma s/p resection 2015, hx of hysterectomy, trigeminal neuralgia presents to ED with Lower abdominal and multiple joint pains.    As per patient accompanied by her partner she went to a party on Saturday where she had pasta and 2 drinks of alcohol and later had watery diarrhea with tenesmus associated with generalized crampy abdominal pain which resolved within 24h, reports no associated vomiting, nausea, blood in stools. On Sunday patient had an episode of severe headache which was similar to migraines she has before associated with some nausea and multiple episodes of vomiting which improved later in the day. Patient felt improved on Monday and Tuesday and on Wednesday went out to eat again and took imodium before that. Patient later had a severe diarrhea after eating which self resolved on Thursday and patient was fine on Thursday night.  On Friday(today) patient woke up in with severe 11/10 cramping deep achy pain mostly in the lower abdomen, bilateral hips radiating all the way to knees bilaterally. Patient reports similar dull pain on the right shoulder. Pain is worse with motion and improves mildly with rest and pain medications.    Patient denies any bloody diarrhea, chest pain, palpitations, fevers, rigors, chills, joint swelling, sick contacts, drug use, over the counter medication use, similar episodes in the past.    In ED patient was hemodynamically stable and had a CT of the Abdomen done which was negative for any pathology. Patient given morphine with partial relief in her complaints. At time of interview endorses 6/10 pain after medication.

## 2020-09-18 NOTE — H&P ADULT - NSHPREVIEWOFSYSTEMS_GEN_ALL_CORE
CONSTITUTIONAL: No weakness, fevers or chills  EYES/ENT: No visual changes;  No vertigo or throat pain   NECK: No pain or stiffness  RESPIRATORY: No cough, wheezing, hemoptysis; No shortness of breath  CARDIOVASCULAR: No chest pain or palpitations  GASTROINTESTINAL: as above  GENITOURINARY: No dysuria, frequency or hematuria  NEUROLOGICAL: generalized pain  SKIN: No itching, rashes

## 2020-09-18 NOTE — ED PROVIDER NOTE - PROGRESS NOTE DETAILS
[facility] PGY3: reassess with Dr. Rojas and walked pt, she is shuffling, atalgic gait. Painful but not lisette romberg or inability to bear weight but still high fall risk. Still significant leg pain w/o improvement. Straight cath ordered. Neuro called [phone] but no answer Ct scan is negative for acute pathology, CPK is WNL, admit for further work up.

## 2020-09-18 NOTE — ED ADULT NURSE NOTE - OBJECTIVE STATEMENT
pt c/o abd pain, leg pain. and right arm pain. pt states it started saturday after a bridal shower, pt also c/o diarrhea. Sx improved and started again yesterday.

## 2020-09-18 NOTE — ED PROVIDER NOTE - ATTENDING CONTRIBUTION TO CARE
49 yo female PMH as noted with residual rt facial numbness c/o abdominal pain for 6 days, this morning developed b/l leg and rt arm pain which is worse with movement,  Diarrhea has resolved, she reports several daily episodes of NBNB diarrhea, no nausea or vomiting,  She was able to tolerate pizza yesterday, this AM she had coffee and took Motrin for pain.  Denies fever, chills, urinary complaints, no focal weakness or paresthesias, no bowel or bladder dysfunction, no recent illness or travel, no sick contacts.  Patient reported headache a few days ago, typical for her headaches and it has resolved since.  Middle aged female, appears uncomfortable, but in NAD, PERRL, pink conjunctivae, supple neck, nml work of breathing, lungs CTA b/l, speaking full sentences, RRR, well-perfused extremities, distal pulses intact, abdomen soft, ND, diffuse ttp without rebound or guarding, no midline spine or CVA ttp, no muscle ttp, patient reluctant to move her joints citing pain, A&Ox3, no focal sensory or motor deficits on a cursory exam, Will treat pain and recheck neuro exam, get labs, CT scan of abdomen /pelvis, reassess.

## 2020-09-18 NOTE — ED ADULT NURSE NOTE - NSIMPLEMENTINTERV_GEN_ALL_ED
Implemented All Fall with Harm Risk Interventions:  [place] to call system. Call bell, personal items and telephone within reach. Instruct patient to call for assistance. Room bathroom lighting operational. Non-slip footwear when patient is off stretcher. Physically safe environment: no spills, clutter or unnecessary equipment. Stretcher in lowest position, wheels locked, appropriate side rails in place. Provide visual cue, wrist band, yellow gown, etc. Monitor gait and stability. Monitor for mental status changes and reorient to person, place, and time. Review medications for side effects contributing to fall risk. Reinforce activity limits and safety measures with patient and family. Provide visual clues: red socks.

## 2020-09-18 NOTE — H&P ADULT - NSHPLABSRESULTS_GEN_ALL_CORE
12.2   5.44  )-----------( 218      ( 18 Sep 2020 14:04 )             36.0       09-18    140  |  106  |  9<L>  ----------------------------<  88  5.0   |  24  |  0.7    Ca    9.7      18 Sep 2020 14:04  Mg     2.0     09-18    TPro  6.6  /  Alb  4.3  /  TBili  0.6  /  DBili  x   /  AST  32  /  ALT  14  /  AlkPhos  57  09-18                  PT/INR - ( 18 Sep 2020 14:04 )   PT: 11.30 sec;   INR: 0.98 ratio         PTT - ( 18 Sep 2020 14:04 )  PTT:21.6 sec    CARDIAC MARKERS ( 18 Sep 2020 14:04 )  x     / x     / 111 U/L / x     / x            CAPILLARY BLOOD GLUCOSE 12.2   5.44  )-----------( 218      ( 18 Sep 2020 14:04 )             36.0       09-18    140  |  106  |  9<L>  ----------------------------<  88  5.0   |  24  |  0.7    Ca    9.7      18 Sep 2020 14:04  Mg     2.0     09-18    TPro  6.6  /  Alb  4.3  /  TBili  0.6  /  DBili  x   /  AST  32  /  ALT  14  /  AlkPhos  57  09-18                  PT/INR - ( 18 Sep 2020 14:04 )   PT: 11.30 sec;   INR: 0.98 ratio         PTT - ( 18 Sep 2020 14:04 )  PTT:21.6 sec    CARDIAC MARKERS ( 18 Sep 2020 14:04 )  x     / x     / 111 U/L / x     / x            CAPILLARY BLOOD GLUCOSE      No CT evidence of abdominal or pelvic pathology    EKG: NSR; qTC 413

## 2020-09-18 NOTE — H&P ADULT - NSHPPHYSICALEXAM_GEN_ALL_CORE
GENERAL: Appears uncomfortable  HEAD:  Atraumatic, Normocephalic  EYES: EOMI, PERRLA, conjunctiva and sclera clear  ENT: Moist mucous membranes  NECK: Supple, No JVD  CHEST/LUNG: Clear to auscultation bilaterally; No rales, rhonchi, wheezing, or rubs. Unlabored respirations  HEART: Regular rate and rhythm; No murmurs, rubs, or gallops  ABDOMEN: Bowel sounds present; Soft, Nontender, Nondistended. No hepatomegaly, lower border of liver palpable 1cm below the costal margin  EXTREMITIES:  Multiple joints including bilateral knees, bilateral hip joints, lower spine, right shoulder tender to palpation, muscle tenderness noted close to the attachment sites, no weakness noted  NERVOUS SYSTEM:  Alert & Oriented X3, speech clear. No deficits   MSK: FROM all 4 extremities, full and equal strength  SKIN: No rashes or lesions

## 2020-09-19 VITALS
RESPIRATION RATE: 18 BRPM | DIASTOLIC BLOOD PRESSURE: 56 MMHG | TEMPERATURE: 98 F | OXYGEN SATURATION: 98 % | HEART RATE: 66 BPM | SYSTOLIC BLOOD PRESSURE: 115 MMHG

## 2020-09-19 LAB
ALBUMIN SERPL ELPH-MCNC: 4 G/DL — SIGNIFICANT CHANGE UP (ref 3.5–5.2)
ALP SERPL-CCNC: 60 U/L — SIGNIFICANT CHANGE UP (ref 30–115)
ALT FLD-CCNC: 15 U/L — SIGNIFICANT CHANGE UP (ref 0–41)
ANION GAP SERPL CALC-SCNC: 10 MMOL/L — SIGNIFICANT CHANGE UP (ref 7–14)
AST SERPL-CCNC: 24 U/L — SIGNIFICANT CHANGE UP (ref 0–41)
BASOPHILS # BLD AUTO: 0.02 K/UL — SIGNIFICANT CHANGE UP (ref 0–0.2)
BASOPHILS NFR BLD AUTO: 0.7 % — SIGNIFICANT CHANGE UP (ref 0–1)
BILIRUB SERPL-MCNC: 0.5 MG/DL — SIGNIFICANT CHANGE UP (ref 0.2–1.2)
BUN SERPL-MCNC: 9 MG/DL — LOW (ref 10–20)
CALCIUM SERPL-MCNC: 9.5 MG/DL — SIGNIFICANT CHANGE UP (ref 8.5–10.1)
CHLORIDE SERPL-SCNC: 108 MMOL/L — SIGNIFICANT CHANGE UP (ref 98–110)
CHOLEST SERPL-MCNC: 222 MG/DL — HIGH (ref 100–200)
CO2 SERPL-SCNC: 25 MMOL/L — SIGNIFICANT CHANGE UP (ref 17–32)
CREAT SERPL-MCNC: 0.5 MG/DL — LOW (ref 0.7–1.5)
CRP SERPL-MCNC: 0.51 MG/DL — HIGH (ref 0–0.4)
EOSINOPHIL # BLD AUTO: 0 K/UL — SIGNIFICANT CHANGE UP (ref 0–0.7)
EOSINOPHIL NFR BLD AUTO: 0 % — SIGNIFICANT CHANGE UP (ref 0–8)
ERYTHROCYTE [SEDIMENTATION RATE] IN BLOOD: 31 MM/HR — HIGH (ref 0–20)
GLUCOSE SERPL-MCNC: 129 MG/DL — HIGH (ref 70–99)
HCT VFR BLD CALC: 32.7 % — LOW (ref 37–47)
HDLC SERPL-MCNC: 65 MG/DL — SIGNIFICANT CHANGE UP
HGB BLD-MCNC: 10.9 G/DL — LOW (ref 12–16)
IMM GRANULOCYTES NFR BLD AUTO: 0.3 % — SIGNIFICANT CHANGE UP (ref 0.1–0.3)
LACTATE SERPL-SCNC: 0.7 MMOL/L — SIGNIFICANT CHANGE UP (ref 0.7–2)
LIPID PNL WITH DIRECT LDL SERPL: 148 MG/DL — HIGH (ref 4–129)
LYMPHOCYTES # BLD AUTO: 0.6 K/UL — LOW (ref 1.2–3.4)
LYMPHOCYTES # BLD AUTO: 20.5 % — SIGNIFICANT CHANGE UP (ref 20.5–51.1)
MAGNESIUM SERPL-MCNC: 2.1 MG/DL — SIGNIFICANT CHANGE UP (ref 1.8–2.4)
MCHC RBC-ENTMCNC: 30.2 PG — SIGNIFICANT CHANGE UP (ref 27–31)
MCHC RBC-ENTMCNC: 33.3 G/DL — SIGNIFICANT CHANGE UP (ref 32–37)
MCV RBC AUTO: 90.6 FL — SIGNIFICANT CHANGE UP (ref 81–99)
MONOCYTES # BLD AUTO: 0.1 K/UL — SIGNIFICANT CHANGE UP (ref 0.1–0.6)
MONOCYTES NFR BLD AUTO: 3.4 % — SIGNIFICANT CHANGE UP (ref 1.7–9.3)
NEUTROPHILS # BLD AUTO: 2.2 K/UL — SIGNIFICANT CHANGE UP (ref 1.4–6.5)
NEUTROPHILS NFR BLD AUTO: 75.1 % — SIGNIFICANT CHANGE UP (ref 42.2–75.2)
NRBC # BLD: 0 /100 WBCS — SIGNIFICANT CHANGE UP (ref 0–0)
PLATELET # BLD AUTO: 205 K/UL — SIGNIFICANT CHANGE UP (ref 130–400)
POTASSIUM SERPL-MCNC: 5.1 MMOL/L — HIGH (ref 3.5–5)
POTASSIUM SERPL-SCNC: 5.1 MMOL/L — HIGH (ref 3.5–5)
PROCALCITONIN SERPL-MCNC: 0.07 NG/ML — SIGNIFICANT CHANGE UP (ref 0.02–0.1)
PROT SERPL-MCNC: 6 G/DL — SIGNIFICANT CHANGE UP (ref 6–8)
RBC # BLD: 3.61 M/UL — LOW (ref 4.2–5.4)
RBC # FLD: 12.5 % — SIGNIFICANT CHANGE UP (ref 11.5–14.5)
SARS-COV-2 RNA SPEC QL NAA+PROBE: SIGNIFICANT CHANGE UP
SODIUM SERPL-SCNC: 143 MMOL/L — SIGNIFICANT CHANGE UP (ref 135–146)
TOTAL CHOLESTEROL/HDL RATIO MEASUREMENT: 3.4 RATIO — LOW (ref 4–5.5)
TRIGL SERPL-MCNC: 53 MG/DL — SIGNIFICANT CHANGE UP (ref 10–149)
WBC # BLD: 2.93 K/UL — LOW (ref 4.8–10.8)
WBC # FLD AUTO: 2.93 K/UL — LOW (ref 4.8–10.8)

## 2020-09-19 PROCEDURE — 99223 1ST HOSP IP/OBS HIGH 75: CPT

## 2020-09-19 RX ADMIN — Medication 20 MILLIGRAM(S): at 05:43

## 2020-09-19 RX ADMIN — Medication 30 MILLILITER(S): at 02:00

## 2020-09-19 RX ADMIN — Medication 200 MILLIGRAM(S): at 17:27

## 2020-09-19 RX ADMIN — Medication 500 MILLIGRAM(S): at 05:44

## 2020-09-19 RX ADMIN — Medication 500 MILLIGRAM(S): at 17:39

## 2020-09-19 RX ADMIN — DULOXETINE HYDROCHLORIDE 20 MILLIGRAM(S): 30 CAPSULE, DELAYED RELEASE ORAL at 11:40

## 2020-09-19 RX ADMIN — ENOXAPARIN SODIUM 40 MILLIGRAM(S): 100 INJECTION SUBCUTANEOUS at 11:40

## 2020-09-19 RX ADMIN — Medication 200 MILLIGRAM(S): at 05:43

## 2020-09-19 RX ADMIN — PANTOPRAZOLE SODIUM 40 MILLIGRAM(S): 20 TABLET, DELAYED RELEASE ORAL at 08:06

## 2020-09-19 RX ADMIN — Medication 500 MILLIGRAM(S): at 17:26

## 2020-09-19 RX ADMIN — Medication 10 MILLIGRAM(S): at 02:01

## 2020-09-19 NOTE — CONSULT NOTE ADULT - ASSESSMENT
Impression:  50 year old Female with Past Medical History of migraine, meningioma s/p resection 2015, hx of hysterectomy, trigeminal neuralgia presents to ED with Lower abdominal and multiple joint pains. Recent history of diarrhea and vomiting.       Suggestion:  PT OT Rehab  Esr, Crp  B12, folate, TSH  Attending to follow.     Cody Smith NP  [phone]

## 2020-09-19 NOTE — CONSULT NOTE ADULT - SUBJECTIVE AND OBJECTIVE BOX
`Neurology Consult    Patient is a 50y old  Female who presents with a chief complaint of Multiple joint pains (18 Sep 2020 18:28)      HPI:  50 year old Female with Past Medical History of migraine, meningioma s/p resection , hx of hysterectomy, trigeminal neuralgia presents to ED with Lower abdominal and multiple joint pains.    As per patient accompanied by her partner she went to a party on Saturday where she had pasta and 2 drinks of alcohol and later had watery diarrhea with tenesmus associated with generalized crampy abdominal pain which resolved within 24h, reports no associated vomiting, nausea, blood in stools. On  patient had an episode of severe headache which was similar to migraines she has before associated with some nausea and multiple episodes of vomiting which improved later in the day. Patient felt improved on Monday and Tuesday and on Wednesday went out to eat again and took imodium before that. Patient later had a severe diarrhea after eating which self resolved on Thursday and patient was fine on Thursday night.  On Friday(today) patient woke up in with severe 11/10 cramping deep achy pain mostly in the lower abdomen, bilateral hips radiating all the way to knees bilaterally. Patient reports similar dull pain on the right shoulder. Pain is worse with motion and improves mildly with rest and pain medications.    Patient denies any bloody diarrhea, chest pain, palpitations, fevers, rigors, chills, joint swelling, sick contacts, drug use, over the counter medication use, similar episodes in the past.    In ED patient was hemodynamically stable and had a CT of the Abdomen done which was negative for any pathology. Patient given morphine with partial relief in her complaints. At time of interview endorses 6/10 pain after medication. (18 Sep 2020 18:28)      PAST MEDICAL & SURGICAL HISTORY:  Migraine    Meningioma    S/P partial hysterectomy        FAMILY HISTORY:  No pertinent family history in first degree relatives        Social History: (-) x 3    Allergies    Trileptal (Other (Severe))    Intolerances        MEDICATIONS  (STANDING):  chlorhexidine 4% Liquid 1 Application(s) Topical daily  DULoxetine 20 milliGRAM(s) Oral daily  enoxaparin Injectable 40 milliGRAM(s) SubCutaneous daily  naproxen 500 milliGRAM(s) Oral two times a day  pantoprazole    Tablet 40 milliGRAM(s) Oral before breakfast  predniSONE   Tablet 20 milliGRAM(s) Oral daily  topiramate 200 milliGRAM(s) Oral two times a day    MEDICATIONS  (PRN):  ALPRAZolam 1 milliGRAM(s) Oral at bedtime PRN insomnia  baclofen 10 milliGRAM(s) Oral three times a day PRN facial pain      Vital Signs Last 24 Hrs  T(C): 36.8 (19 Sep 2020 05:41), Max: 37.1 (18 Sep 2020 13:35)  T(F): 98.2 (19 Sep 2020 05:41), Max: 98.8 (18 Sep 2020 13:35)  HR: 53 (19 Sep 2020 05:41) (53 - 88)  BP: 101/57 (19 Sep 2020 05:41) (100/54 - 117/58)  BP(mean): --  RR: 18 (19 Sep 2020 05:41) (17 - 18)  SpO2: 100% (19 Sep 2020 05:41) (99% - 100%)    Examination: Exam limited, at hour of sleep.  General:  Appearance is consistent with chronologic age.  No abnormal facies.  Gross skin survey within normal limits.    Cognitive/Language:  The patient is oriented to person, place, time and date.  Recent and remote memory intact.  Fund of knowledge is intact and normal.  Language with normal repetition, comprehension and naming.  Nondysarthric.    Eyes: intact VA, VFF.  EOMI w/o nystagmus, skew or reported double vision.  PERRL.  No ptosis/weakness of eyelid closure.    Face:  Facial sensation normal V1 - 3, no facial asymmetry.    Motor examination:   Normal tone, bulk and range of motion.  No tenderness, twitching, tremors or involuntary movements.  Formal Muscle Strength Testing: (MRC grade R/L) 5/5 UE; 45 LE.  alos 4/5 dorsi and plantar flexion    Reflexes 2+ throuhout  Sensory examination:   Intact to light touch and pinprick, pain, temperature and proprioception and vibration in all extremities.  Cerebellum:   FTN/HKS intact with normal ENRIQUE in all limbs.  No dysmetria or dysdiadokinesia.  Gait deferred    Labs:   CBC Full  -  ( 19 Sep 2020 05:35 )  WBC Count : 2.93 K/uL  RBC Count : 3.61 M/uL  Hemoglobin : 10.9 g/dL  Hematocrit : 32.7 %  Platelet Count - Automated : 205 K/uL  Mean Cell Volume : 90.6 fL  Mean Cell Hemoglobin : 30.2 pg  Mean Cell Hemoglobin Concentration : 33.3 g/dL  Auto Neutrophil # : 2.20 K/uL  Auto Lymphocyte # : 0.60 K/uL  Auto Monocyte # : 0.10 K/uL  Auto Eosinophil # : 0.00 K/uL  Auto Basophil # : 0.02 K/uL  Auto Neutrophil % : 75.1 %  Auto Lymphocyte % : 20.5 %  Auto Monocyte % : 3.4 %  Auto Eosinophil % : 0.0 %  Auto Basophil % : 0.7 %        143  |  108  |  9<L>  ----------------------------<  129<H>  5.1<H>   |  25  |  0.5<L>    Ca    9.5      19 Sep 2020 05:35  Mg     2.1         TPro  6.0  /  Alb  4.0  /  TBili  0.5  /  DBili  x   /  AST  24  /  ALT  15  /  AlkPhos  60      LIVER FUNCTIONS - ( 19 Sep 2020 05:35 )  Alb: 4.0 g/dL / Pro: 6.0 g/dL / ALK PHOS: 60 U/L / ALT: 15 U/L / AST: 24 U/L / GGT: x           PT/INR - ( 18 Sep 2020 14:04 )   PT: 11.30 sec;   INR: 0.98 ratio         PTT - ( 18 Sep 2020 14:04 )  PTT:21.6 sec  Urinalysis Basic - ( 18 Sep 2020 20:30 )    Color: Light Yellow / Appearance: Clear / S.029 / pH: x  Gluc: x / Ketone: Negative  / Bili: Negative / Urobili: <2 mg/dL   Blood: x / Protein: Negative / Nitrite: Negative   Leuk Esterase: Negative / RBC: x / WBC x   Sq Epi: x / Non Sq Epi: x / Bacteria: x

## 2020-09-19 NOTE — CONSULT NOTE ADULT - ATTENDING COMMENTS
Patient seen and examined and agree with above except as noted.  Patients history, notes, labs, imaging, vitals and meds reviewed personally.  Patient still complaining of pain and radiation to groin and feet.  Exam poor effort in LE b/l, DTR 3+ throughout, plantars down b/l, decreased vib and temp in LE b/l    Plan  1. xray b/l hips  2. pain management  3. Call back if pain does not improve over next 24 hours and then would also order MRI L/S spine w/o MARIA ISABEL

## 2020-09-22 LAB — HLA-B27 QL: SIGNIFICANT CHANGE UP

## 2020-09-25 ENCOUNTER — APPOINTMENT (OUTPATIENT)
Dept: NEUROLOGY | Facility: CLINIC | Age: 50
End: 2020-09-25
Payer: COMMERCIAL

## 2020-09-25 VITALS
WEIGHT: 115 LBS | SYSTOLIC BLOOD PRESSURE: 98 MMHG | HEIGHT: 61 IN | BODY MASS INDEX: 21.71 KG/M2 | HEART RATE: 68 BPM | DIASTOLIC BLOOD PRESSURE: 66 MMHG | TEMPERATURE: 97.2 F | OXYGEN SATURATION: 98 %

## 2020-09-25 PROCEDURE — 99496 TRANSJ CARE MGMT HIGH F2F 7D: CPT

## 2020-09-25 NOTE — HISTORY OF PRESENT ILLNESS
[FreeTextEntry1] : Since her last visit, Ms. Chance had been in her usual state of health when she developed diarrhea for 2 days after attending a party 2 weeks ago.  Diarrhea associated with head pressure that improved after vomiting.  Had another bout a diarrhea a few days after and started developing acute severe pain in the knees radiating into the hips, pelvis R wrist and ribs that started on Friday.  Was severe enough that she went to [facility] and sent to [facility].  While there pt had negative CT A/P, had mild elevation in ESR/CRP and thought to have a reactive arthropathy.  Pt was seen by Dr. Anderson who suspected a Rheumatologic process but pt left [place] before any additional studies were done.  Since leaving hospital, arthralgias still persist localized to the joints and painful at baseline but much worse with movement.  Denies any myalgias or urinary discoloration.  Denies any cramping or burning.  No numbness.  Has some neck and lower back pain also but pain in back localized to midline without radiation.  Denies any weakness.  Had some prednisone and naprosyn in hospital but unclear if it helped.

## 2020-09-25 NOTE — PHYSICAL EXAM
[FreeTextEntry1] : NAD.  AOx3.  Intact memory.  Speech fluent, nondysarthric.  CN 2 – 12 Mild right ptosis with divergence on right head tilt with convergence on left head tilt, otherwise normal.  No facial tenderness to palpation or allodynia.  Strength 5/5 RUE, b/l LE.  (+) pain in the wrists, knees and shoulders with both TTP, active and passive ROM.  (+) TTP spinous processes back.  DTRs 2+ throughout.  Plantar response downgoing b/l.  (-) Hoffmans, clonus.  Sensory intact LT/PP, pain, temp, proprioception and vibration.  NL FTN/HKS.  No dysdiadokinesia.  Gait narrow based/NL tandem.

## 2020-09-25 NOTE — ASSESSMENT
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  \par \par Plan:\par - Continue Topamax 200 mg b.i.d.\par - Continue Lyrica 150 mg BID\par -  Baclofen 10 mg TID PRN\par - b/w as per PMD\par - Naprosyn and Medrol dosepak w/ PPI for now\par - Rheumatology evaluation\par - RTC at next scheduled appointment\par

## 2020-10-05 NOTE — H&P ADULT - NSHPPHYSICALEXAM_GEN_ALL_CORE
Rotation Flap Text: The defect edges were debeveled with a #15 scalpel blade.  Given the location of the defect, shape of the defect and the proximity to free margins a rotation flap was deemed most appropriate.  Using a sterile surgical marker, an appropriate rotation flap was drawn incorporating the defect and placing the expected incisions within the relaxed skin tension lines where possible.    The area thus outlined was incised deep to adipose tissue with a #15 scalpel blade.  The skin margins were undermined to an appropriate distance in all directions utilizing iris scissors. Complex Repair And Epidermal Autograft Text: The defect edges were debeveled with a #15 scalpel blade.  The primary defect was closed partially with a complex linear closure.  Given the location of the defect, shape of the defect and the proximity to free margins an epidermal autograft was deemed most appropriate to repair the remaining defect.  The graft was trimmed to fit the size of the remaining defect.  The graft was then placed in the primary defect, oriented appropriately, and sutured into place. Cartilage Graft Text: The defect edges were debeveled with a #15 scalpel blade.  Given the location of the defect, shape of the defect, the fact the defect involved a full thickness cartilage defect a cartilage graft was deemed most appropriate.  An appropriate donor site was identified, cleansed, and anesthetized. The cartilage graft was then harvested and transferred to the recipient site, oriented appropriately and then sutured into place.  The secondary defect was then repaired using a primary closure. PHYSICAL EXAM:  GENERAL: NAD, well-developed  HEAD:  Atraumatic, Normocephalic.   EYES: Right ptosis, conjunctiva and sclera clear  NECK: Supple, No JVD  CHEST/LUNG: Clear to auscultation bilaterally; No wheeze  HEART: Regular rate and rhythm; No murmurs, rubs, or gallops  ABDOMEN: Soft, Nontender, Nondistended; Bowel sounds present  EXTREMITIES:  2+ Peripheral Pulses, No clubbing, cyanosis, or edema  PSYCH: AAOx3  NEUROLOGY: No aphasia, no dysarthria, +ve LUE drift, sensation intact in b/l UE though reduced in Left UE.   SKIN: No rashes or lesions Interpolation Flap Text: A decision was made to reconstruct the defect utilizing an interpolation axial flap and a staged reconstruction.  A telfa template was made of the defect.  This telfa template was then used to outline the interpolation flap.  The donor area for the pedicle flap was then injected with anesthesia.  The flap was excised through the skin and subcutaneous tissue down to the layer of the underlying musculature.  The interpolation flap was carefully excised within this deep plane to maintain its blood supply.  The edges of the donor site were undermined.   The donor site was closed in a primary fashion.  The pedicle was then rotated into position and sutured.  Once the tube was sutured into place, adequate blood supply was confirmed with blanching and refill.  The pedicle was then wrapped with xeroform gauze and dressed appropriately with a telfa and gauze bandage to ensure continued blood supply and protect the attached pedicle. Complex Repair And V-Y Plasty Text: The defect edges were debeveled with a #15 scalpel blade.  The primary defect was closed partially with a complex linear closure.  Given the location of the remaining defect, shape of the defect and the proximity to free margins a V-Y plasty was deemed most appropriate for complete closure of the defect.  Using a sterile surgical marker, an appropriate advancement flap was drawn incorporating the defect and placing the expected incisions within the relaxed skin tension lines where possible.    The area thus outlined was incised deep to adipose tissue with a #15 scalpel blade.  The skin margins were undermined to an appropriate distance in all directions utilizing iris scissors. Validate Referring Provider (Can Hide Referring Provider In Settings Tab): No Scalpel Size: dermablade Lip Wedge Excision Repair Text: Given the location of the defect and the proximity to free margins a full thickness wedge repair was deemed most appropriate.  Using a sterile surgical marker, the appropriate repair was drawn incorporating the defect and placing the expected incisions perpendicular to the vermillion border.  The vermillion border was also meticulously outlined to ensure appropriate reapproximation during the repair.  The area thus outlined was incised through and through with a #15 scalpel blade.  The muscularis and dermis were reaproximated with deep sutures following hemostasis. Care was taken to realign the vermillion border before proceeding with the superficial closure.  Once the vermillion was realigned the superfical and mucosal closure was finished. Show Previous Accession Variable: Yes 5 Information: Selecting Yes will display possible errors in your note based on the variables you have selected. This validation is only offered as a suggestion for you. PLEASE NOTE THAT THE VALIDATION TEXT WILL BE REMOVED WHEN YOU FINALIZE YOUR NOTE. IF YOU WANT TO FAX A PRELIMINARY NOTE YOU WILL NEED TO TOGGLE THIS TO 'NO' IF YOU DO NOT WANT IT IN YOUR FAXED NOTE. Eliptical Excision Additional Text (Leave Blank If You Do Not Want): The margin was drawn around the clinically apparent lesion.  An elliptical shape was then drawn on the skin incorporating the lesion and margins.  Incisions were then made along these lines to the appropriate tissue plane and the lesion was extirpated. Bi-Rhombic Flap Text: The defect edges were debeveled with a #15 scalpel blade.  Given the location of the defect and the proximity to free margins a bi-rhombic flap was deemed most appropriate.  Using a sterile surgical marker, an appropriate rhombic flap was drawn incorporating the defect. The area thus outlined was incised deep to adipose tissue with a #15 scalpel blade.  The skin margins were undermined to an appropriate distance in all directions utilizing iris scissors. Xenograft Text: The defect edges were debeveled with a #15 scalpel blade.  Given the location of the defect, shape of the defect and the proximity to free margins a xenograft was deemed most appropriate.  The graft was then trimmed to fit the size of the defect.  The graft was then placed in the primary defect and oriented appropriately. Nostril Rim Text: The closure involved the nostril rim. M-Plasty Complex Repair Preamble Text (Leave Blank If You Do Not Want): Extensive wide undermining was performed. Number Of Hemigard Strips Per Side: 1 Anesthesia Volume In Cc: 0 Double O-Z Plasty Text: The defect edges were debeveled with a #15 scalpel blade.  Given the location of the defect, shape of the defect and the proximity to free margins a Double O-Z plasty (double transposition flap) was deemed most appropriate.  Using a sterile surgical marker, the appropriate transposition flaps were drawn incorporating the defect and placing the expected incisions within the relaxed skin tension lines where possible. The area thus outlined was incised deep to adipose tissue with a #15 scalpel blade.  The skin margins were undermined to an appropriate distance in all directions utilizing iris scissors.  Hemostasis was achieved with electrocautery.  The flaps were then transposed into place, one clockwise and the other counterclockwise, and anchored with interrupted buried subcutaneous sutures. Complex Repair And A-T Advancement Flap Text: The defect edges were debeveled with a #15 scalpel blade.  The primary defect was closed partially with a complex linear closure.  Given the location of the remaining defect, shape of the defect and the proximity to free margins an A-T advancement flap was deemed most appropriate for complete closure of the defect.  Using a sterile surgical marker, an appropriate advancement flap was drawn incorporating the defect and placing the expected incisions within the relaxed skin tension lines where possible.    The area thus outlined was incised deep to adipose tissue with a #15 scalpel blade.  The skin margins were undermined to an appropriate distance in all directions utilizing iris scissors. Saucerization Excision Additional Text (Leave Blank If You Do Not Want): The margin was drawn around the clinically apparent lesion.  Incisions were then made along these lines, in a tangential fashion, to the appropriate tissue plane and the lesion was extirpated. Advancement Flap (Double) Text: The defect edges were debeveled with a #15 scalpel blade.  Given the location of the defect and the proximity to free margins a double advancement flap was deemed most appropriate.  Using a sterile surgical marker, the appropriate advancement flaps were drawn incorporating the defect and placing the expected incisions within the relaxed skin tension lines where possible.    The area thus outlined was incised deep to adipose tissue with a #15 scalpel blade.  The skin margins were undermined to an appropriate distance in all directions utilizing iris scissors. Complex Repair And O-T Advancement Flap Text: The defect edges were debeveled with a #15 scalpel blade.  The primary defect was closed partially with a complex linear closure.  Given the location of the remaining defect, shape of the defect and the proximity to free margins an O-T advancement flap was deemed most appropriate for complete closure of the defect.  Using a sterile surgical marker, an appropriate advancement flap was drawn incorporating the defect and placing the expected incisions within the relaxed skin tension lines where possible.    The area thus outlined was incised deep to adipose tissue with a #15 scalpel blade.  The skin margins were undermined to an appropriate distance in all directions utilizing iris scissors. Complex Repair And Rotation Flap Text: The defect edges were debeveled with a #15 scalpel blade.  The primary defect was closed partially with a complex linear closure.  Given the location of the remaining defect, shape of the defect and the proximity to free margins a rotation flap was deemed most appropriate for complete closure of the defect.  Using a sterile surgical marker, an appropriate advancement flap was drawn incorporating the defect and placing the expected incisions within the relaxed skin tension lines where possible.    The area thus outlined was incised deep to adipose tissue with a #15 scalpel blade.  The skin margins were undermined to an appropriate distance in all directions utilizing iris scissors. Bilobed Transposition Flap Text: The defect edges were debeveled with a #15 scalpel blade.  Given the location of the defect and the proximity to free margins a bilobed transposition flap was deemed most appropriate.  Using a sterile surgical marker, an appropriate bilobe flap drawn around the defect.    The area thus outlined was incised deep to adipose tissue with a #15 scalpel blade.  The skin margins were undermined to an appropriate distance in all directions utilizing iris scissors. Tissue Cultured Epidermal Autograft Text: The defect edges were debeveled with a #15 scalpel blade.  Given the location of the defect, shape of the defect and the proximity to free margins a tissue cultured epidermal autograft was deemed most appropriate.  The graft was then trimmed to fit the size of the defect.  The graft was then placed in the primary defect and oriented appropriately. O-T Plasty Text: The defect edges were debeveled with a #15 scalpel blade.  Given the location of the defect, shape of the defect and the proximity to free margins an O-T plasty was deemed most appropriate.  Using a sterile surgical marker, an appropriate O-T plasty was drawn incorporating the defect and placing the expected incisions within the relaxed skin tension lines where possible.    The area thus outlined was incised deep to adipose tissue with a #15 scalpel blade.  The skin margins were undermined to an appropriate distance in all directions utilizing iris scissors. Anesthesia Type: 2% lidocaine with epinephrine and a 1:10 solution of 8.4% sodium bicarbonate Excision Depth: dermis Anesthesia Type: 1% lidocaine with 1:100,000 epinephrine and a 1:10 solution of 8.4% sodium bicarbonate Crescentic Advancement Flap Text: The defect edges were debeveled with a #15 scalpel blade.  Given the location of the defect and the proximity to free margins a crescentic advancement flap was deemed most appropriate.  Using a sterile surgical marker, the appropriate advancement flap was drawn incorporating the defect and placing the expected incisions within the relaxed skin tension lines where possible.    The area thus outlined was incised deep to adipose tissue with a #15 scalpel blade.  The skin margins were undermined to an appropriate distance in all directions utilizing iris scissors. Advancement Flap (Single) Text: The defect edges were debeveled with a #15 scalpel blade.  Given the location of the defect and the proximity to free margins a single advancement flap was deemed most appropriate.  Using a sterile surgical marker, an appropriate advancement flap was drawn incorporating the defect and placing the expected incisions within the relaxed skin tension lines where possible.    The area thus outlined was incised deep to adipose tissue with a #15 scalpel blade.  The skin margins were undermined to an appropriate distance in all directions utilizing iris scissors. Complex Repair And Xenograft Text: The defect edges were debeveled with a #15 scalpel blade.  The primary defect was closed partially with a complex linear closure.  Given the location of the defect, shape of the defect and the proximity to free margins an tissue cultured epidermal autograft was deemed most appropriate to repair the remaining defect.  The graft was trimmed to fit the size of the remaining defect.  The graft was then placed in the primary defect, oriented appropriately, and sutured into place. Keystone Flap Text: The defect edges were debeveled with a #15 scalpel blade.  Given the location of the defect, shape of the defect a keystone flap was deemed most appropriate.  Using a sterile surgical marker, an appropriate keystone flap was drawn incorporating the defect, outlining the appropriate donor tissue and placing the expected incisions within the relaxed skin tension lines where possible. The area thus outlined was incised deep to adipose tissue with a #15 scalpel blade.  The skin margins were undermined to an appropriate distance in all directions around the primary defect and laterally outward around the flap utilizing iris scissors. V-Y Plasty Text: The defect edges were debeveled with a #15 scalpel blade.  Given the location of the defect, shape of the defect and the proximity to free margins an V-Y advancement flap was deemed most appropriate.  Using a sterile surgical marker, an appropriate advancement flap was drawn incorporating the defect and placing the expected incisions within the relaxed skin tension lines where possible.    The area thus outlined was incised deep to adipose tissue with a #15 scalpel blade.  The skin margins were undermined to an appropriate distance in all directions utilizing iris scissors. Medical Necessity Information: It is in your best interest to select a reason for this procedure from the list below. All of these items fulfill various CMS LCD requirements except lesion extends to a margin. Suturegard Body: The suture ends were repeatedly re-tightened and re-clamped to achieve the desired tissue expansion. O-L Flap Text: The defect edges were debeveled with a #15 scalpel blade.  Given the location of the defect, shape of the defect and the proximity to free margins an O-L flap was deemed most appropriate.  Using a sterile surgical marker, an appropriate advancement flap was drawn incorporating the defect and placing the expected incisions within the relaxed skin tension lines where possible.    The area thus outlined was incised deep to adipose tissue with a #15 scalpel blade.  The skin margins were undermined to an appropriate distance in all directions utilizing iris scissors. Detail Level: Detailed Complex Repair And Melolabial Flap Text: The defect edges were debeveled with a #15 scalpel blade.  The primary defect was closed partially with a complex linear closure.  Given the location of the remaining defect, shape of the defect and the proximity to free margins a melolabial flap was deemed most appropriate for complete closure of the defect.  Using a sterile surgical marker, an appropriate advancement flap was drawn incorporating the defect and placing the expected incisions within the relaxed skin tension lines where possible.    The area thus outlined was incised deep to adipose tissue with a #15 scalpel blade.  The skin margins were undermined to an appropriate distance in all directions utilizing iris scissors. Mercedes Flap Text: The defect edges were debeveled with a #15 scalpel blade.  Given the location of the defect, shape of the defect and the proximity to free margins a Mercedes flap was deemed most appropriate.  Using a sterile surgical marker, an appropriate advancement flap was drawn incorporating the defect and placing the expected incisions within the relaxed skin tension lines where possible. The area thus outlined was incised deep to adipose tissue with a #15 scalpel blade.  The skin margins were undermined to an appropriate distance in all directions utilizing iris scissors. Path Notes (To The Dermatopathologist): Please check margins. Melolabial Transposition Flap Text: The defect edges were debeveled with a #15 scalpel blade.  Given the location of the defect and the proximity to free margins a melolabial flap was deemed most appropriate.  Using a sterile surgical marker, an appropriate melolabial transposition flap was drawn incorporating the defect.    The area thus outlined was incised deep to adipose tissue with a #15 scalpel blade.  The skin margins were undermined to an appropriate distance in all directions utilizing iris scissors. Banner Transposition Flap Text: The defect edges were debeveled with a #15 scalpel blade.  Given the location of the defect and the proximity to free margins a Banner transposition flap was deemed most appropriate.  Using a sterile surgical marker, an appropriate flap drawn around the defect. The area thus outlined was incised deep to adipose tissue with a #15 scalpel blade.  The skin margins were undermined to an appropriate distance in all directions utilizing iris scissors. Spiral Flap Text: The defect edges were debeveled with a #15 scalpel blade.  Given the location of the defect, shape of the defect and the proximity to free margins a spiral flap was deemed most appropriate.  Using a sterile surgical marker, an appropriate rotation flap was drawn incorporating the defect and placing the expected incisions within the relaxed skin tension lines where possible. The area thus outlined was incised deep to adipose tissue with a #15 scalpel blade.  The skin margins were undermined to an appropriate distance in all directions utilizing iris scissors. Post-Care Instructions: I reviewed with the patient in detail post-care instructions. Patient is not to engage in any heavy lifting, exercise, or swimming for the next 14 days. Should the patient develop any fevers, chills, bleeding, severe pain patient will contact the office immediately. Hemigard Intro: Due to skin fragility and wound tension, it was decided to use HEMIGARD adhesive retention suture devices to permit a linear closure. The skin was cleaned and dried for a 6cm distance away from the wound. Excessive hair, if present, was removed to allow for adhesion. Complex Repair And W Plasty Text: The defect edges were debeveled with a #15 scalpel blade.  The primary defect was closed partially with a complex linear closure.  Given the location of the remaining defect, shape of the defect and the proximity to free margins a W plasty was deemed most appropriate for complete closure of the defect.  Using a sterile surgical marker, an appropriate advancement flap was drawn incorporating the defect and placing the expected incisions within the relaxed skin tension lines where possible.    The area thus outlined was incised deep to adipose tissue with a #15 scalpel blade.  The skin margins were undermined to an appropriate distance in all directions utilizing iris scissors. Perilesional Excision Additional Text (Leave Blank If You Do Not Want): The margin was drawn around the clinically apparent lesion. Incisions were then made along these lines to the appropriate tissue plane and the lesion was extirpated. Complex Repair And Ftsg Text: The defect edges were debeveled with a #15 scalpel blade.  The primary defect was closed partially with a complex linear closure.  Given the location of the defect, shape of the defect and the proximity to free margins a full thickness skin graft was deemed most appropriate to repair the remaining defect.  The graft was trimmed to fit the size of the remaining defect.  The graft was then placed in the primary defect, oriented appropriately, and sutured into place. Chonodrocutaneous Helical Advancement Flap Text: The defect edges were debeveled with a #15 scalpel blade.  Given the location of the defect and the proximity to free margins a chondrocutaneous helical advancement flap was deemed most appropriate.  Using a sterile surgical marker, the appropriate advancement flap was drawn incorporating the defect and placing the expected incisions within the relaxed skin tension lines where possible.    The area thus outlined was incised deep to adipose tissue with a #15 scalpel blade.  The skin margins were undermined to an appropriate distance in all directions utilizing iris scissors. Excision Method: Saucerization Complex Repair And Bilobe Flap Text: The defect edges were debeveled with a #15 scalpel blade.  The primary defect was closed partially with a complex linear closure.  Given the location of the remaining defect, shape of the defect and the proximity to free margins a bilobe flap was deemed most appropriate for complete closure of the defect.  Using a sterile surgical marker, an appropriate advancement flap was drawn incorporating the defect and placing the expected incisions within the relaxed skin tension lines where possible.    The area thus outlined was incised deep to adipose tissue with a #15 scalpel blade.  The skin margins were undermined to an appropriate distance in all directions utilizing iris scissors. Vermilion Border Text: The closure involved the vermilion border. Star Wedge Flap Text: The defect edges were debeveled with a #15 scalpel blade.  Given the location of the defect, shape of the defect and the proximity to free margins a star wedge flap was deemed most appropriate.  Using a sterile surgical marker, an appropriate rotation flap was drawn incorporating the defect and placing the expected incisions within the relaxed skin tension lines where possible. The area thus outlined was incised deep to adipose tissue with a #15 scalpel blade.  The skin margins were undermined to an appropriate distance in all directions utilizing iris scissors. Transposition Flap Text: The defect edges were debeveled with a #15 scalpel blade.  Given the location of the defect and the proximity to free margins a transposition flap was deemed most appropriate.  Using a sterile surgical marker, an appropriate transposition flap was drawn incorporating the defect.    The area thus outlined was incised deep to adipose tissue with a #15 scalpel blade.  The skin margins were undermined to an appropriate distance in all directions utilizing iris scissors. Debridement Text: The wound edges were debrided prior to proceeding with the closure to facilitate wound healing. Island Pedicle Flap Text: The defect edges were debeveled with a #15 scalpel blade.  Given the location of the defect, shape of the defect and the proximity to free margins an island pedicle advancement flap was deemed most appropriate.  Using a sterile surgical marker, an appropriate advancement flap was drawn incorporating the defect, outlining the appropriate donor tissue and placing the expected incisions within the relaxed skin tension lines where possible.    The area thus outlined was incised deep to adipose tissue with a #15 scalpel blade.  The skin margins were undermined to an appropriate distance in all directions around the primary defect and laterally outward around the island pedicle utilizing iris scissors.  There was minimal undermining beneath the pedicle flap. Mucosal Advancement Flap Text: Given the location of the defect, shape of the defect and the proximity to free margins a mucosal advancement flap was deemed most appropriate. Incisions were made with a 15 blade scalpel in the appropriate fashion along the cutaneous vermillion border and the mucosal lip. The remaining actinically damaged mucosal tissue was excised.  The mucosal advancement flap was then elevated to the gingival sulcus with care taken to preserve the neurovascular structures and advanced into the primary defect. Care was taken to ensure that precise realignment of the vermillion border was achieved. Consent was obtained from the patient. The risks and benefits to therapy were discussed in detail. Specifically, the risks of infection, scarring, bleeding, prolonged wound healing, incomplete removal, allergy to anesthesia, nerve injury and recurrence were addressed. Prior to the procedure, the treatment site was clearly identified and confirmed by the patient. All components of Universal Protocol/PAUSE Rule completed. Complex Repair And Single Advancement Flap Text: The defect edges were debeveled with a #15 scalpel blade.  The primary defect was closed partially with a complex linear closure.  Given the location of the remaining defect, shape of the defect and the proximity to free margins a single advancement flap was deemed most appropriate for complete closure of the defect.  Using a sterile surgical marker, an appropriate advancement flap was drawn incorporating the defect and placing the expected incisions within the relaxed skin tension lines where possible.    The area thus outlined was incised deep to adipose tissue with a #15 scalpel blade.  The skin margins were undermined to an appropriate distance in all directions utilizing iris scissors. Complex Repair And Dorsal Nasal Flap Text: The defect edges were debeveled with a #15 scalpel blade.  The primary defect was closed partially with a complex linear closure.  Given the location of the remaining defect, shape of the defect and the proximity to free margins a dorsal nasal flap was deemed most appropriate for complete closure of the defect.  Using a sterile surgical marker, an appropriate flap was drawn incorporating the defect and placing the expected incisions within the relaxed skin tension lines where possible.    The area thus outlined was incised deep to adipose tissue with a #15 scalpel blade.  The skin margins were undermined to an appropriate distance in all directions utilizing iris scissors. Z Plasty Text: The lesion was extirpated to the level of the fat with a #15 scalpel blade.  Given the location of the defect, shape of the defect and the proximity to free margins a Z-plasty was deemed most appropriate for repair.  Using a sterile surgical marker, the appropriate transposition arms of the Z-plasty were drawn incorporating the defect and placing the expected incisions within the relaxed skin tension lines where possible.    The area thus outlined was incised deep to adipose tissue with a #15 scalpel blade.  The skin margins were undermined to an appropriate distance in all directions utilizing iris scissors.  The opposing transposition arms were then transposed into place in opposite direction and anchored with interrupted buried subcutaneous sutures. Burow's Graft Text: The defect edges were debeveled with a #15 scalpel blade.  Given the location of the defect, shape of the defect, the proximity to free margins and the presence of a standing cone deformity a Burow's skin graft was deemed most appropriate. The standing cone was removed and this tissue was then trimmed to the shape of the primary defect. The adipose tissue was also removed until only dermis and epidermis were left.  The skin margins of the secondary defect were undermined to an appropriate distance in all directions utilizing iris scissors.  The secondary defect was closed with interrupted buried subcutaneous sutures.  The skin edges were then re-apposed with running  sutures.  The skin graft was then placed in the primary defect and oriented appropriately. W Plasty Text: The lesion was extirpated to the level of the fat with a #15 scalpel blade.  Given the location of the defect, shape of the defect and the proximity to free margins a W-plasty was deemed most appropriate for repair.  Using a sterile surgical marker, the appropriate transposition arms of the W-plasty were drawn incorporating the defect and placing the expected incisions within the relaxed skin tension lines where possible.    The area thus outlined was incised deep to adipose tissue with a #15 scalpel blade.  The skin margins were undermined to an appropriate distance in all directions utilizing iris scissors.  The opposing transposition arms were then transposed into place in opposite direction and anchored with interrupted buried subcutaneous sutures. Island Pedicle Flap-Requiring Vessel Identification Text: The defect edges were debeveled with a #15 scalpel blade.  Given the location of the defect, shape of the defect and the proximity to free margins an island pedicle advancement flap was deemed most appropriate.  Using a sterile surgical marker, an appropriate advancement flap was drawn, based on the axial vessel mentioned above, incorporating the defect, outlining the appropriate donor tissue and placing the expected incisions within the relaxed skin tension lines where possible.    The area thus outlined was incised deep to adipose tissue with a #15 scalpel blade.  The skin margins were undermined to an appropriate distance in all directions around the primary defect and laterally outward around the island pedicle utilizing iris scissors.  There was minimal undermining beneath the pedicle flap. Rhombic Flap Text: The defect edges were debeveled with a #15 scalpel blade.  Given the location of the defect and the proximity to free margins a rhombic flap was deemed most appropriate.  Using a sterile surgical marker, an appropriate rhombic flap was drawn incorporating the defect.    The area thus outlined was incised deep to adipose tissue with a #15 scalpel blade.  The skin margins were undermined to an appropriate distance in all directions utilizing iris scissors. Fusiform Excision Additional Text (Leave Blank If You Do Not Want): The margin was drawn around the clinically apparent lesion.  A fusiform shape was then drawn on the skin incorporating the lesion and margins.  Incisions were then made along these lines to the appropriate tissue plane and the lesion was extirpated. Trilobed Flap Text: The defect edges were debeveled with a #15 scalpel blade.  Given the location of the defect and the proximity to free margins a trilobed flap was deemed most appropriate.  Using a sterile surgical marker, an appropriate trilobed flap drawn around the defect.    The area thus outlined was incised deep to adipose tissue with a #15 scalpel blade.  The skin margins were undermined to an appropriate distance in all directions utilizing iris scissors. No Repair - Repaired With Adjacent Surgical Defect Text (Leave Blank If You Do Not Want): After the excision the defect was repaired concurrently with another surgical defect which was in close approximation. Hemigard Retention Suture: 2-0 Nylon Billing Type: Third-Party Bill O-T Advancement Flap Text: The defect edges were debeveled with a #15 scalpel blade.  Given the location of the defect, shape of the defect and the proximity to free margins an O-T advancement flap was deemed most appropriate.  Using a sterile surgical marker, an appropriate advancement flap was drawn incorporating the defect and placing the expected incisions within the relaxed skin tension lines where possible.    The area thus outlined was incised deep to adipose tissue with a #15 scalpel blade.  The skin margins were undermined to an appropriate distance in all directions utilizing iris scissors. Complex Repair And Rhombic Flap Text: The defect edges were debeveled with a #15 scalpel blade.  The primary defect was closed partially with a complex linear closure.  Given the location of the remaining defect, shape of the defect and the proximity to free margins a rhombic flap was deemed most appropriate for complete closure of the defect.  Using a sterile surgical marker, an appropriate advancement flap was drawn incorporating the defect and placing the expected incisions within the relaxed skin tension lines where possible.    The area thus outlined was incised deep to adipose tissue with a #15 scalpel blade.  The skin margins were undermined to an appropriate distance in all directions utilizing iris scissors. Epidermal Closure: none-secondary intention Purse String (Intermediate) Text: Given the location of the defect and the characteristics of the surrounding skin a pursestring intermediate closure was deemed most appropriate.  Undermining was performed circumfirentially around the surgical defect.  A purstring suture was then placed and tightened. Excisional Biopsy Additional Text (Leave Blank If You Do Not Want): The margin was drawn around the clinically apparent lesion. An elliptical shape was then drawn on the skin incorporating the lesion and margins.  Incisions were then made along these lines to the appropriate tissue plane and the lesion was extirpated. Complex Repair And Split-Thickness Skin Graft Text: The defect edges were debeveled with a #15 scalpel blade.  The primary defect was closed partially with a complex linear closure.  Given the location of the defect, shape of the defect and the proximity to free margins a split thickness skin graft was deemed most appropriate to repair the remaining defect.  The graft was trimmed to fit the size of the remaining defect.  The graft was then placed in the primary defect, oriented appropriately, and sutured into place. Composite Graft Text: The defect edges were debeveled with a #15 scalpel blade.  Given the location of the defect, shape of the defect, the proximity to free margins and the fact the defect was full thickness a composite graft was deemed most appropriate.  The defect was outline and then transferred to the donor site.  A full thickness graft was then excised from the donor site. The graft was then placed in the primary defect, oriented appropriately and then sutured into place.  The secondary defect was then repaired using a primary closure. Double M-Plasty Intermediate Repair Preamble Text (Leave Blank If You Do Not Want): Undermining was performed with blunt dissection. Bilateral Helical Rim Advancement Flap Text: The defect edges were debeveled with a #15 blade scalpel.  Given the location of the defect and the proximity to free margins (helical rim) a bilateral helical rim advancement flap was deemed most appropriate.  Using a sterile surgical marker, the appropriate advancement flaps were drawn incorporating the defect and placing the expected incisions between the helical rim and antihelix where possible.  The area thus outlined was incised through and through with a #15 scalpel blade.  With a skin hook and iris scissors, the flaps were gently and sharply undermined and freed up. Burow's Advancement Flap Text: The defect edges were debeveled with a #15 scalpel blade.  Given the location of the defect and the proximity to free margins a Burow's advancement flap was deemed most appropriate.  Using a sterile surgical marker, the appropriate advancement flap was drawn incorporating the defect and placing the expected incisions within the relaxed skin tension lines where possible.    The area thus outlined was incised deep to adipose tissue with a #15 scalpel blade.  The skin margins were undermined to an appropriate distance in all directions utilizing iris scissors. Modified Advancement Flap Text: The defect edges were debeveled with a #15 scalpel blade.  Given the location of the defect, shape of the defect and the proximity to free margins a modified advancement flap was deemed most appropriate.  Using a sterile surgical marker, an appropriate advancement flap was drawn incorporating the defect and placing the expected incisions within the relaxed skin tension lines where possible.    The area thus outlined was incised deep to adipose tissue with a #15 scalpel blade.  The skin margins were undermined to an appropriate distance in all directions utilizing iris scissors. Muscle Hinge Flap Text: The defect edges were debeveled with a #15 scalpel blade.  Given the size, depth and location of the defect and the proximity to free margins a muscle hinge flap was deemed most appropriate.  Using a sterile surgical marker, an appropriate hinge flap was drawn incorporating the defect. The area thus outlined was incised with a #15 scalpel blade.  The skin margins were undermined to an appropriate distance in all directions utilizing iris scissors. Mastoid Interpolation Flap Text: A decision was made to reconstruct the defect utilizing an interpolation axial flap and a staged reconstruction.  A telfa template was made of the defect.  This telfa template was then used to outline the mastoid interpolation flap.  The donor area for the pedicle flap was then injected with anesthesia.  The flap was excised through the skin and subcutaneous tissue down to the layer of the underlying musculature.  The pedicle flap was carefully excised within this deep plane to maintain its blood supply.  The edges of the donor site were undermined.   The donor site was closed in a primary fashion.  The pedicle was then rotated into position and sutured.  Once the tube was sutured into place, adequate blood supply was confirmed with blanching and refill.  The pedicle was then wrapped with xeroform gauze and dressed appropriately with a telfa and gauze bandage to ensure continued blood supply and protect the attached pedicle. Melolabial Interpolation Flap Text: A decision was made to reconstruct the defect utilizing an interpolation axial flap and a staged reconstruction.  A telfa template was made of the defect.  This telfa template was then used to outline the melolabial interpolation flap.  The donor area for the pedicle flap was then injected with anesthesia.  The flap was excised through the skin and subcutaneous tissue down to the layer of the underlying musculature.  The pedicle flap was carefully excised within this deep plane to maintain its blood supply.  The edges of the donor site were undermined.   The donor site was closed in a primary fashion.  The pedicle was then rotated into position and sutured.  Once the tube was sutured into place, adequate blood supply was confirmed with blanching and refill.  The pedicle was then wrapped with xeroform gauze and dressed appropriately with a telfa and gauze bandage to ensure continued blood supply and protect the attached pedicle. Bilobed Flap Text: The defect edges were debeveled with a #15 scalpel blade.  Given the location of the defect and the proximity to free margins a bilobe flap was deemed most appropriate.  Using a sterile surgical marker, an appropriate bilobe flap drawn around the defect.    The area thus outlined was incised deep to adipose tissue with a #15 scalpel blade.  The skin margins were undermined to an appropriate distance in all directions utilizing iris scissors. Slit Excision Additional Text (Leave Blank If You Do Not Want): A linear line was drawn on the skin overlying the lesion. An incision was made slowly until the lesion was visualized.  Once visualized, the lesion was removed with blunt dissection. Dermal Autograft Text: The defect edges were debeveled with a #15 scalpel blade.  Given the location of the defect, shape of the defect and the proximity to free margins a dermal autograft was deemed most appropriate.  Using a sterile surgical marker, the primary defect shape was transferred to the donor site. The area thus outlined was incised deep to adipose tissue with a #15 scalpel blade.  The harvested graft was then trimmed of adipose and epidermal tissue until only dermis was left.  The skin graft was then placed in the primary defect and oriented appropriately. Helical Rim Advancement Flap Text: The defect edges were debeveled with a #15 blade scalpel.  Given the location of the defect and the proximity to free margins (helical rim) a double helical rim advancement flap was deemed most appropriate.  Using a sterile surgical marker, the appropriate advancement flaps were drawn incorporating the defect and placing the expected incisions between the helical rim and antihelix where possible.  The area thus outlined was incised through and through with a #15 scalpel blade.  With a skin hook and iris scissors, the flaps were gently and sharply undermined and freed up. Complex Repair And Modified Advancement Flap Text: The defect edges were debeveled with a #15 scalpel blade.  The primary defect was closed partially with a complex linear closure.  Given the location of the remaining defect, shape of the defect and the proximity to free margins a modified advancement flap was deemed most appropriate for complete closure of the defect.  Using a sterile surgical marker, an appropriate advancement flap was drawn incorporating the defect and placing the expected incisions within the relaxed skin tension lines where possible.    The area thus outlined was incised deep to adipose tissue with a #15 scalpel blade.  The skin margins were undermined to an appropriate distance in all directions utilizing iris scissors. Complex Repair And Transposition Flap Text: The defect edges were debeveled with a #15 scalpel blade.  The primary defect was closed partially with a complex linear closure.  Given the location of the remaining defect, shape of the defect and the proximity to free margins a transposition flap was deemed most appropriate for complete closure of the defect.  Using a sterile surgical marker, an appropriate advancement flap was drawn incorporating the defect and placing the expected incisions within the relaxed skin tension lines where possible.    The area thus outlined was incised deep to adipose tissue with a #15 scalpel blade.  The skin margins were undermined to an appropriate distance in all directions utilizing iris scissors. O-Z Plasty Text: The defect edges were debeveled with a #15 scalpel blade.  Given the location of the defect, shape of the defect and the proximity to free margins an O-Z plasty (double transposition flap) was deemed most appropriate.  Using a sterile surgical marker, the appropriate transposition flaps were drawn incorporating the defect and placing the expected incisions within the relaxed skin tension lines where possible.    The area thus outlined was incised deep to adipose tissue with a #15 scalpel blade.  The skin margins were undermined to an appropriate distance in all directions utilizing iris scissors.  Hemostasis was achieved with electrocautery.  The flaps were then transposed into place, one clockwise and the other counterclockwise, and anchored with interrupted buried subcutaneous sutures. Purse String (Simple) Text: Given the location of the defect and the characteristics of the surrounding skin a purse string simple closure was deemed most appropriate.  Undermining was performed circumferentially around the surgical defect.  A purse string suture was then placed and tightened. Skin Substitute Text: The defect edges were debeveled with a #15 scalpel blade.  Given the location of the defect, shape of the defect and the proximity to free margins a skin substitute graft was deemed most appropriate.  The graft material was trimmed to fit the size of the defect. The graft was then placed in the primary defect and oriented appropriately. Complex Repair And Burow's Graft Text: The defect edges were debeveled with a #15 scalpel blade.  The primary defect was closed partially with a complex linear closure.  Given the location of the defect, shape of the defect, the proximity to free margins and the presence of a standing cone deformity a Burow's graft was deemed most appropriate to repair the remaining defect.  The graft was trimmed to fit the size of the remaining defect.  The graft was then placed in the primary defect, oriented appropriately, and sutured into place. Size Of Lesion In Cm: 0.7 Complex Repair And Skin Substitute Graft Text: The defect edges were debeveled with a #15 scalpel blade.  The primary defect was closed partially with a complex linear closure.  Given the location of the remaining defect, shape of the defect and the proximity to free margins a skin substitute graft was deemed most appropriate to repair the remaining defect.  The graft was trimmed to fit the size of the remaining defect.  The graft was then placed in the primary defect, oriented appropriately, and sutured into place. V-Y Flap Text: The defect edges were debeveled with a #15 scalpel blade.  Given the location of the defect, shape of the defect and the proximity to free margins a V-Y flap was deemed most appropriate.  Using a sterile surgical marker, an appropriate advancement flap was drawn incorporating the defect and placing the expected incisions within the relaxed skin tension lines where possible.    The area thus outlined was incised deep to adipose tissue with a #15 scalpel blade.  The skin margins were undermined to an appropriate distance in all directions utilizing iris scissors. Additional Anesthesia Volume In Cc: 0.8 Ear Star Wedge Flap Text: The defect edges were debeveled with a #15 blade scalpel.  Given the location of the defect and the proximity to free margins (helical rim) an ear star wedge flap was deemed most appropriate.  Using a sterile surgical marker, the appropriate flap was drawn incorporating the defect and placing the expected incisions between the helical rim and antihelix where possible.  The area thus outlined was incised through and through with a #15 scalpel blade. Ftsg Text: The defect edges were debeveled with a #15 scalpel blade.  Given the location of the defect, shape of the defect and the proximity to free margins a full thickness skin graft was deemed most appropriate.  Using a sterile surgical marker, the primary defect shape was transferred to the donor site. The area thus outlined was incised deep to adipose tissue with a #15 scalpel blade.  The harvested graft was then trimmed of adipose tissue until only dermis and epidermis was left.  The skin margins of the secondary defect were undermined to an appropriate distance in all directions utilizing iris scissors.  The secondary defect was closed with interrupted buried subcutaneous sutures.  The skin edges were then re-apposed with running  sutures.  The skin graft was then placed in the primary defect and oriented appropriately. Split-Thickness Skin Graft Text: The defect edges were debeveled with a #15 scalpel blade.  Given the location of the defect, shape of the defect and the proximity to free margins a split thickness skin graft was deemed most appropriate.  Using a sterile surgical marker, the primary defect shape was transferred to the donor site. The split thickness graft was then harvested.  The skin graft was then placed in the primary defect and oriented appropriately. Complex Repair And Dermal Autograft Text: The defect edges were debeveled with a #15 scalpel blade.  The primary defect was closed partially with a complex linear closure.  Given the location of the defect, shape of the defect and the proximity to free margins an dermal autograft was deemed most appropriate to repair the remaining defect.  The graft was trimmed to fit the size of the remaining defect.  The graft was then placed in the primary defect, oriented appropriately, and sutured into place. Zygomaticofacial Flap Text: Given the location of the defect, shape of the defect and the proximity to free margins a zygomaticofacial flap was deemed most appropriate for repair.  Using a sterile surgical marker, the appropriate flap was drawn incorporating the defect and placing the expected incisions within the relaxed skin tension lines where possible. The area thus outlined was incised deep to adipose tissue with a #15 scalpel blade with preservation of a vascular pedicle.  The skin margins were undermined to an appropriate distance in all directions utilizing iris scissors.  The flap was then placed into the defect and anchored with interrupted buried subcutaneous sutures. Home Suture Removal Text: Patient was provided a home suture removal kit and will remove their sutures at home.  If they have any questions or difficulties they will call the office. Hemostasis: Aluminum Chloride and Electrocautery Retention Suture Bite Size: 3 mm Epidermal Closure Graft Donor Site (Optional): simple interrupted Length To Time In Minutes Device Was In Place: 10 Complex Repair And Double Advancement Flap Text: The defect edges were debeveled with a #15 scalpel blade.  The primary defect was closed partially with a complex linear closure.  Given the location of the remaining defect, shape of the defect and the proximity to free margins a double advancement flap was deemed most appropriate for complete closure of the defect.  Using a sterile surgical marker, an appropriate advancement flap was drawn incorporating the defect and placing the expected incisions within the relaxed skin tension lines where possible.    The area thus outlined was incised deep to adipose tissue with a #15 scalpel blade.  The skin margins were undermined to an appropriate distance in all directions utilizing iris scissors. Dressing: pressure dressing Estimated Blood Loss (Cc): minimal O-Z Flap Text: The defect edges were debeveled with a #15 scalpel blade.  Given the location of the defect, shape of the defect and the proximity to free margins an O-Z flap was deemed most appropriate.  Using a sterile surgical marker, an appropriate transposition flap was drawn incorporating the defect and placing the expected incisions within the relaxed skin tension lines where possible. The area thus outlined was incised deep to adipose tissue with a #15 scalpel blade.  The skin margins were undermined to an appropriate distance in all directions utilizing iris scissors. Double Island Pedicle Flap Text: The defect edges were debeveled with a #15 scalpel blade.  Given the location of the defect, shape of the defect and the proximity to free margins a double island pedicle advancement flap was deemed most appropriate.  Using a sterile surgical marker, an appropriate advancement flap was drawn incorporating the defect, outlining the appropriate donor tissue and placing the expected incisions within the relaxed skin tension lines where possible.    The area thus outlined was incised deep to adipose tissue with a #15 scalpel blade.  The skin margins were undermined to an appropriate distance in all directions around the primary defect and laterally outward around the island pedicle utilizing iris scissors.  There was minimal undermining beneath the pedicle flap. Repair Type: None Epidermal Autograft Text: The defect edges were debeveled with a #15 scalpel blade.  Given the location of the defect, shape of the defect and the proximity to free margins an epidermal autograft was deemed most appropriate.  Using a sterile surgical marker, the primary defect shape was transferred to the donor site. The epidermal graft was then harvested.  The skin graft was then placed in the primary defect and oriented appropriately. H Plasty Text: Given the location of the defect, shape of the defect and the proximity to free margins a H-plasty was deemed most appropriate for repair.  Using a sterile surgical marker, the appropriate advancement arms of the H-plasty were drawn incorporating the defect and placing the expected incisions within the relaxed skin tension lines where possible. The area thus outlined was incised deep to adipose tissue with a #15 scalpel blade. The skin margins were undermined to an appropriate distance in all directions utilizing iris scissors.  The opposing advancement arms were then advanced into place in opposite direction and anchored with interrupted buried subcutaneous sutures. Hemigard Postcare Instructions: The HEMIGARD strips are to remain completely dry for at least 5-7 days. Posterior Auricular Interpolation Flap Text: A decision was made to reconstruct the defect utilizing an interpolation axial flap and a staged reconstruction.  A telfa template was made of the defect.  This telfa template was then used to outline the posterior auricular interpolation flap.  The donor area for the pedicle flap was then injected with anesthesia.  The flap was excised through the skin and subcutaneous tissue down to the layer of the underlying musculature.  The pedicle flap was carefully excised within this deep plane to maintain its blood supply.  The edges of the donor site were undermined.   The donor site was closed in a primary fashion.  The pedicle was then rotated into position and sutured.  Once the tube was sutured into place, adequate blood supply was confirmed with blanching and refill.  The pedicle was then wrapped with xeroform gauze and dressed appropriately with a telfa and gauze bandage to ensure continued blood supply and protect the attached pedicle. Helical Rim Text: The closure involved the helical rim. Anesthesia Volume In Cc: 0.5 Retention Suture Text: Retention sutures were placed to support the closure and prevent dehiscence. Where Do You Want The Question To Include Opioid Counseling Located?: Case Summary Tab Dorsal Nasal Flap Text: The defect edges were debeveled with a #15 scalpel blade.  Given the location of the defect and the proximity to free margins a dorsal nasal flap was deemed most appropriate.  Using a sterile surgical marker, an appropriate dorsal nasal flap was drawn around the defect.    The area thus outlined was incised deep to adipose tissue with a #15 scalpel blade.  The skin margins were undermined to an appropriate distance in all directions utilizing iris scissors. Wound Care: Vaseline Island Pedicle Flap With Canthal Suspension Text: The defect edges were debeveled with a #15 scalpel blade.  Given the location of the defect, shape of the defect and the proximity to free margins an island pedicle advancement flap was deemed most appropriate.  Using a sterile surgical marker, an appropriate advancement flap was drawn incorporating the defect, outlining the appropriate donor tissue and placing the expected incisions within the relaxed skin tension lines where possible. The area thus outlined was incised deep to adipose tissue with a #15 scalpel blade.  The skin margins were undermined to an appropriate distance in all directions around the primary defect and laterally outward around the island pedicle utilizing iris scissors.  There was minimal undermining beneath the pedicle flap. A suspension suture was placed in the canthal tendon to prevent tension and prevent ectropion. Complex Repair And O-L Flap Text: The defect edges were debeveled with a #15 scalpel blade.  The primary defect was closed partially with a complex linear closure.  Given the location of the remaining defect, shape of the defect and the proximity to free margins an O-L flap was deemed most appropriate for complete closure of the defect.  Using a sterile surgical marker, an appropriate flap was drawn incorporating the defect and placing the expected incisions within the relaxed skin tension lines where possible.    The area thus outlined was incised deep to adipose tissue with a #15 scalpel blade.  The skin margins were undermined to an appropriate distance in all directions utilizing iris scissors. Repair Performed By Another Provider Text (Leave Blank If You Do Not Want): After the tissue was excised the defect was repaired by another provider. Cheek-To-Nose Interpolation Flap Text: A decision was made to reconstruct the defect utilizing an interpolation axial flap and a staged reconstruction.  A telfa template was made of the defect.  This telfa template was then used to outline the Cheek-To-Nose Interpolation flap.  The donor area for the pedicle flap was then injected with anesthesia.  The flap was excised through the skin and subcutaneous tissue down to the layer of the underlying musculature.  The interpolation flap was carefully excised within this deep plane to maintain its blood supply.  The edges of the donor site were undermined.   The donor site was closed in a primary fashion.  The pedicle was then rotated into position and sutured.  Once the tube was sutured into place, adequate blood supply was confirmed with blanching and refill.  The pedicle was then wrapped with xeroform gauze and dressed appropriately with a telfa and gauze bandage to ensure continued blood supply and protect the attached pedicle. Cheek Interpolation Flap Text: A decision was made to reconstruct the defect utilizing an interpolation axial flap and a staged reconstruction.  A telfa template was made of the defect.  This telfa template was then used to outline the Cheek Interpolation flap.  The donor area for the pedicle flap was then injected with anesthesia.  The flap was excised through the skin and subcutaneous tissue down to the layer of the underlying musculature.  The interpolation flap was carefully excised within this deep plane to maintain its blood supply.  The edges of the donor site were undermined.   The donor site was closed in a primary fashion.  The pedicle was then rotated into position and sutured.  Once the tube was sutured into place, adequate blood supply was confirmed with blanching and refill.  The pedicle was then wrapped with xeroform gauze and dressed appropriately with a telfa and gauze bandage to ensure continued blood supply and protect the attached pedicle. Hatchet Flap Text: The defect edges were debeveled with a #15 scalpel blade.  Given the location of the defect, shape of the defect and the proximity to free margins a hatchet flap was deemed most appropriate.  Using a sterile surgical marker, an appropriate hatchet flap was drawn incorporating the defect and placing the expected incisions within the relaxed skin tension lines where possible.    The area thus outlined was incised deep to adipose tissue with a #15 scalpel blade.  The skin margins were undermined to an appropriate distance in all directions utilizing iris scissors. Undermining Type: Entire Wound Complex Repair And M Plasty Text: The defect edges were debeveled with a #15 scalpel blade.  The primary defect was closed partially with a complex linear closure.  Given the location of the remaining defect, shape of the defect and the proximity to free margins an M plasty was deemed most appropriate for complete closure of the defect.  Using a sterile surgical marker, an appropriate advancement flap was drawn incorporating the defect and placing the expected incisions within the relaxed skin tension lines where possible.    The area thus outlined was incised deep to adipose tissue with a #15 scalpel blade.  The skin margins were undermined to an appropriate distance in all directions utilizing iris scissors. Double O-Z Flap Text: The defect edges were debeveled with a #15 scalpel blade.  Given the location of the defect, shape of the defect and the proximity to free margins a Double O-Z flap was deemed most appropriate.  Using a sterile surgical marker, an appropriate transposition flap was drawn incorporating the defect and placing the expected incisions within the relaxed skin tension lines where possible. The area thus outlined was incised deep to adipose tissue with a #15 scalpel blade.  The skin margins were undermined to an appropriate distance in all directions utilizing iris scissors. Alar Island Pedicle Flap Text: The defect edges were debeveled with a #15 scalpel blade.  Given the location of the defect, shape of the defect and the proximity to the alar rim an island pedicle advancement flap was deemed most appropriate.  Using a sterile surgical marker, an appropriate advancement flap was drawn incorporating the defect, outlining the appropriate donor tissue and placing the expected incisions within the nasal ala running parallel to the alar rim. The area thus outlined was incised with a #15 scalpel blade.  The skin margins were undermined minimally to an appropriate distance in all directions around the primary defect and laterally outward around the island pedicle utilizing iris scissors.  There was minimal undermining beneath the pedicle flap. Paramedian Forehead Flap Text: A decision was made to reconstruct the defect utilizing an interpolation axial flap and a staged reconstruction.  A telfa template was made of the defect.  This telfa template was then used to outline the paramedian forehead pedicle flap.  The donor area for the pedicle flap was then injected with anesthesia.  The flap was excised through the skin and subcutaneous tissue down to the layer of the underlying musculature.  The pedicle flap was carefully excised within this deep plane to maintain its blood supply.  The edges of the donor site were undermined.   The donor site was closed in a primary fashion.  The pedicle was then rotated into position and sutured.  Once the tube was sutured into place, adequate blood supply was confirmed with blanching and refill.  The pedicle was then wrapped with xeroform gauze and dressed appropriately with a telfa and gauze bandage to ensure continued blood supply and protect the attached pedicle. A-T Advancement Flap Text: The defect edges were debeveled with a #15 scalpel blade.  Given the location of the defect, shape of the defect and the proximity to free margins an A-T advancement flap was deemed most appropriate.  Using a sterile surgical marker, an appropriate advancement flap was drawn incorporating the defect and placing the expected incisions within the relaxed skin tension lines where possible.    The area thus outlined was incised deep to adipose tissue with a #15 scalpel blade.  The skin margins were undermined to an appropriate distance in all directions utilizing iris scissors. Complex Repair And Double M Plasty Text: The defect edges were debeveled with a #15 scalpel blade.  The primary defect was closed partially with a complex linear closure.  Given the location of the remaining defect, shape of the defect and the proximity to free margins a double M plasty was deemed most appropriate for complete closure of the defect.  Using a sterile surgical marker, an appropriate advancement flap was drawn incorporating the defect and placing the expected incisions within the relaxed skin tension lines where possible.    The area thus outlined was incised deep to adipose tissue with a #15 scalpel blade.  The skin margins were undermined to an appropriate distance in all directions utilizing iris scissors. Medical Necessity Clause: This procedure was medically necessary because the lesion that was treated was: Size Of Margin In Cm: 0.2 Rhomboid Transposition Flap Text: The defect edges were debeveled with a #15 scalpel blade.  Given the location of the defect and the proximity to free margins a rhomboid transposition flap was deemed most appropriate.  Using a sterile surgical marker, an appropriate rhomboid flap was drawn incorporating the defect.    The area thus outlined was incised deep to adipose tissue with a #15 scalpel blade.  The skin margins were undermined to an appropriate distance in all directions utilizing iris scissors. Complex Repair And Z Plasty Text: The defect edges were debeveled with a #15 scalpel blade.  The primary defect was closed partially with a complex linear closure.  Given the location of the remaining defect, shape of the defect and the proximity to free margins a Z plasty was deemed most appropriate for complete closure of the defect.  Using a sterile surgical marker, an appropriate advancement flap was drawn incorporating the defect and placing the expected incisions within the relaxed skin tension lines where possible.    The area thus outlined was incised deep to adipose tissue with a #15 scalpel blade.  The skin margins were undermined to an appropriate distance in all directions utilizing iris scissors. Suturegard Intro: Intraoperative tissue expansion was performed, utilizing the SUTUREGARD device, in order to reduce wound tension.

## 2020-10-08 ENCOUNTER — APPOINTMENT (OUTPATIENT)
Dept: OTOLARYNGOLOGY | Facility: CLINIC | Age: 50
End: 2020-10-08
Payer: COMMERCIAL

## 2020-10-08 VITALS — WEIGHT: 117 LBS | BODY MASS INDEX: 22.11 KG/M2

## 2020-10-08 DIAGNOSIS — H93.8X1 OTHER SPECIFIED DISORDERS OF RIGHT EAR: ICD-10-CM

## 2020-10-08 DIAGNOSIS — R04.0 EPISTAXIS: ICD-10-CM

## 2020-10-08 DIAGNOSIS — H69.83 OTHER SPECIFIED DISORDERS OF EUSTACHIAN TUBE, BILATERAL: ICD-10-CM

## 2020-10-08 PROCEDURE — 92557 COMPREHENSIVE HEARING TEST: CPT

## 2020-10-08 PROCEDURE — 99204 OFFICE O/P NEW MOD 45 MIN: CPT | Mod: 25

## 2020-10-08 PROCEDURE — 92550 TYMPANOMETRY & REFLEX THRESH: CPT

## 2020-10-08 PROCEDURE — 31231 NASAL ENDOSCOPY DX: CPT

## 2020-10-08 NOTE — HISTORY OF PRESENT ILLNESS
[de-identified] : Patient presents today with c/o pressure and hearing loss in her right ear. Constant feeling of fluid in ear. No drainage. Patient has h/o benign brain tumor (meningioma) removal 4 years ago, removed at [facility] (Dr. Villa). Immediately post op she couldn’t hear from right ear. F/u with ENT post op and drained fluid, which brought the hearing back. Since then she has been having right ear issues. She states after muffled right ear.  Frequent sinus infections requiring abx treatment, about 7 cases in the last year. Advil cold and sinus and Flonase haven't been providing relief. \par Right sided epistaxis occurs every other day. Burning sensation. Spontaneous bleeding occurs. Has tried Vaseline and Bactrian without help. Holds tissue until bleeding stops. Takes naproxen a few times a week for RA. Denies hx of HTN.

## 2020-10-29 ENCOUNTER — APPOINTMENT (OUTPATIENT)
Dept: OTOLARYNGOLOGY | Facility: CLINIC | Age: 50
End: 2020-10-29

## 2021-02-08 ENCOUNTER — APPOINTMENT (OUTPATIENT)
Dept: NEUROLOGY | Facility: CLINIC | Age: 51
End: 2021-02-08
Payer: COMMERCIAL

## 2021-02-08 VITALS
HEIGHT: 62 IN | BODY MASS INDEX: 21.71 KG/M2 | TEMPERATURE: 98 F | DIASTOLIC BLOOD PRESSURE: 73 MMHG | HEART RATE: 71 BPM | WEIGHT: 118 LBS | SYSTOLIC BLOOD PRESSURE: 108 MMHG | OXYGEN SATURATION: 98 %

## 2021-02-08 PROCEDURE — 99214 OFFICE O/P EST MOD 30 MIN: CPT

## 2021-02-08 PROCEDURE — 99072 ADDL SUPL MATRL&STAF TM PHE: CPT

## 2021-02-08 NOTE — ASSESSMENT
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  In addition has had several recurrent episodes of migrainous headaches despite prophylactic medications.  Recommend restart abortives.  \par \par Plan:\par - Continue Topamax 200 mg b.i.d.\par - Continue Lyrica 150 mg BID\par -  Baclofen 10 mg TID PRN\par - start Maxalt MLT 10 mg PRN\par - f/u w/ Rheumatology for RA treatments\par - RTC 4 months

## 2021-02-08 NOTE — HISTORY OF PRESENT ILLNESS
[FreeTextEntry1] : Since her last visit, Ms. Chance is doing well and has not had any significant worsening in facial pain.  Is tolerating medications well.  Had seen Dr. Hsu from Rheumatology who diagnosed her with RA and started on some injections initially with methotrexate but switched recently.  Pt unclear which injection she's on currently but arthropathy has improved slightly.  HA are otherwise stable with current prophylactic medications and pt denies any new symptoms.  Has had recurrent episodic severe headaches described as severe unilateral pain with photophobia and N/V lasting 1 - 2 days.  Has tried Imitrex in the past which didn't help.  Pt unclear if other triptans were tried.  Had trouble getting insurance coverage for Treximet in the past.

## 2021-03-01 ENCOUNTER — TRANSCRIPTION ENCOUNTER (OUTPATIENT)
Age: 51
End: 2021-03-01

## 2021-06-15 ENCOUNTER — APPOINTMENT (OUTPATIENT)
Dept: OBGYN | Facility: CLINIC | Age: 51
End: 2021-06-15

## 2021-06-17 ENCOUNTER — APPOINTMENT (OUTPATIENT)
Dept: OBGYN | Facility: CLINIC | Age: 51
End: 2021-06-17

## 2021-06-24 ENCOUNTER — APPOINTMENT (OUTPATIENT)
Dept: NEUROLOGY | Facility: CLINIC | Age: 51
End: 2021-06-24
Payer: COMMERCIAL

## 2021-06-24 VITALS
HEART RATE: 76 BPM | HEIGHT: 61 IN | TEMPERATURE: 98 F | WEIGHT: 122 LBS | SYSTOLIC BLOOD PRESSURE: 105 MMHG | BODY MASS INDEX: 23.03 KG/M2 | DIASTOLIC BLOOD PRESSURE: 69 MMHG

## 2021-06-24 DIAGNOSIS — M54.5 LOW BACK PAIN: ICD-10-CM

## 2021-06-24 PROCEDURE — 99214 OFFICE O/P EST MOD 30 MIN: CPT

## 2021-06-24 RX ORDER — SUMATRIPTAN AND NAPROXEN SODIUM 85; 500 MG/1; MG/1
85-500 TABLET, FILM COATED ORAL
Qty: 10 | Refills: 5 | Status: DISCONTINUED | COMMUNITY
Start: 2019-09-12 | End: 2021-06-24

## 2021-06-24 RX ORDER — RIZATRIPTAN BENZOATE 10 MG/1
10 TABLET, ORALLY DISINTEGRATING ORAL
Qty: 10 | Refills: 5 | Status: DISCONTINUED | COMMUNITY
Start: 2021-02-08 | End: 2021-06-24

## 2021-06-24 NOTE — ASSESSMENT
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  Since pt responded well to Treximet in past, will d/c maxalt and start sumatriptan with naproxen as abortive combination.\par \par Plan:\par - Continue Topamax 200 mg b.i.d.\par - Continue Lyrica 150 mg BID\par - Baclofen 10 mg TID PRN\par - d/c maxalt and start sumatriptan 50 mg with naprosyn 500 mg PRN\par - MRI Head w/w/o sergio\par - f/u w/ Rheumatology for RA treatments\par - RTC 4 months

## 2021-06-24 NOTE — PHYSICAL EXAM
[FreeTextEntry1] : NAD.  AOx3.  Intact memory.  Speech fluent, nondysarthric.  CN 2 – 12 Mild right ptosis with divergence on right head tilt with convergence on left head tilt, otherwise normal.  No facial tenderness to palpation or allodynia.  Strength 5/5 RUE, b/l LE.   (-) TTP or allodynia R temporal.  DTRs 2+ throughout.  Plantar response downgoing b/l.  (-) Hoffmans, clonus.  Sensory intact LT/PP, pain, temp, proprioception and vibration.  NL FTN/HKS.  No dysdiadokinesia.  Gait narrow based/NL tandem.

## 2021-06-24 NOTE — HISTORY OF PRESENT ILLNESS
[FreeTextEntry1] : Since her last visit, Ms. Chance has not had any improvement in migraine with Maxalt.  Frequency controlled with Topamax 200 mg BID.  Neuralgia stable with Lyrica 150 mg BID and Baclofen 10 mg  QD.  Denies any new focal deficits but has noted occasional burning dyesthesias in the R temporal area lasting for 1 - 2 hours not a/w HA or neck pain.  Has independent neck soreness as well without radiation into or weakness of the arms.  Tolerating medications but has not noted improvement with Maxalt as abortive.  Still requiring ibuprofen.  Reports improvement with Treximet in the past but denied by insurance.

## 2021-06-28 ENCOUNTER — TRANSCRIPTION ENCOUNTER (OUTPATIENT)
Age: 51
End: 2021-06-28

## 2021-08-02 ENCOUNTER — NON-APPOINTMENT (OUTPATIENT)
Age: 51
End: 2021-08-02

## 2021-08-05 ENCOUNTER — APPOINTMENT (OUTPATIENT)
Dept: OBGYN | Facility: CLINIC | Age: 51
End: 2021-08-05

## 2021-08-05 ENCOUNTER — APPOINTMENT (OUTPATIENT)
Dept: NEUROSURGERY | Facility: CLINIC | Age: 51
End: 2021-08-05
Payer: COMMERCIAL

## 2021-08-05 PROCEDURE — 99214 OFFICE O/P EST MOD 30 MIN: CPT

## 2021-08-05 NOTE — PLAN
[FreeTextEntry1] : 1. She is due to see Dr Sarabia Monday to discuss repeat Gamma knife\par 2. I will review her films with our neuroradiloist to determine the feasability and safety of a glycerol rhizotomy in the setting of anatomical disruption of her skull base and get back to her about this option\par 3. Referral to pain management specialist Dr Herbert from [facility]\par  \par We also discussed the option for trigeminal nerve section which she was understandably not keen to pursue.

## 2021-08-05 NOTE — CONSULT LETTER
[Dear  ___] : Dear  [unfilled], [Consult Letter:] : I had the pleasure of evaluating your patient, [unfilled]. [Please see my note below.] : Please see my note below. [Consult Closing:] : Thank you very much for allowing me to participate in the care of this patient.  If you have any questions, please do not hesitate to contact me. [Sincerely,] : Sincerely, [FreeTextEntry3] : Chay Monroe MD, FR, [facility] [DrLucien  ___] : Dr. DESAI [DrLucien ___] : Dr. DESAI

## 2021-08-05 NOTE — HISTORY OF PRESENT ILLNESS
[de-identified] : Remedios presents as a referral for evaluation of surgical options for management of her refractory right sided trigeminal neuralgia. 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 Has tried multiple medical therapies with minimal relief. She underwent gamma knife radiosurgery at [facility]  with a temporary response but now her pain has returned and is significantly impacting her quality of life. \par \par She is allergic to Trileptal. Tegretol therapy has previously been tried\par \par She also endorse a V1 type and scalp distribution numbness and dysesthesia. I do not think this pain is anesthesia dolorosa in its classic form but is certainly related to trigeminal nerve damage and not classic for trigeminal neuralgia

## 2021-08-05 NOTE — ASSESSMENT
[FreeTextEntry1] : On physical exam she has evidence of numbness along all distributions of her trigeminal nerve and face to light touch. She also does not appear to have corneal sensation on the right hand side\par \par She has ptosis of the right eye and claims to have a decreased field of vision on the right - confrontation field testing was not accurate but I did note a right  sided 6th and 4th palsy

## 2021-08-06 ENCOUNTER — APPOINTMENT (OUTPATIENT)
Dept: OBGYN | Facility: CLINIC | Age: 51
End: 2021-08-06
Payer: COMMERCIAL

## 2021-08-06 VITALS — DIASTOLIC BLOOD PRESSURE: 61 MMHG | HEART RATE: 83 BPM | SYSTOLIC BLOOD PRESSURE: 92 MMHG

## 2021-08-06 VITALS — TEMPERATURE: 97.9 F | HEIGHT: 61 IN | WEIGHT: 117 LBS | BODY MASS INDEX: 22.09 KG/M2

## 2021-08-06 DIAGNOSIS — N76.0 ACUTE VAGINITIS: ICD-10-CM

## 2021-08-06 DIAGNOSIS — Z87.891 PERSONAL HISTORY OF NICOTINE DEPENDENCE: ICD-10-CM

## 2021-08-06 PROCEDURE — 81003 URINALYSIS AUTO W/O SCOPE: CPT | Mod: QW

## 2021-08-06 PROCEDURE — 99213 OFFICE O/P EST LOW 20 MIN: CPT

## 2021-08-06 NOTE — PLAN
[FreeTextEntry1] : Advised to stop taking the estradiol vaginal cream given new onset bleeding and no significant improvement in dyspareunia symptoms since starting medication.\par Urinary and vaginal cultures obtained today, will follow up and treat accordingly.\par Perineal hygiene practices reviewed, advised to avoid products with containing additives/scents or antibacterial components, and the use of cotton under garments and loose fitting clothing.\par

## 2021-08-06 NOTE — PHYSICAL EXAM
[Appropriately responsive] : appropriately responsive [Alert] : alert [No Acute Distress] : no acute distress [Oriented x3] : oriented x3 [Vulvar Atrophy] : vulvar atrophy [Labia Majora] : normal [Labia Minora] : normal [Atrophy] : atrophy [Normal] : normal [Absent] : absent [Uterine Adnexae] : normal [FreeTextEntry5] : Unlabored

## 2021-08-06 NOTE — HISTORY OF PRESENT ILLNESS
[FreeTextEntry1] : 50-year-old female here for GYN visit.  She was started on vaginal estrogen at her last visit in November for dyspareunia and menopausal atrophy.  She recently noticed after intercourse she started to bleed which ended on its own however she then noticed to bled again when using the restroom that was unrelated to intercourse.  She also complains of general discomfort in the vulvovaginal area.  She has a history of a supracervical hysterectomy.

## 2021-08-07 LAB
APPEARANCE: CLEAR
BILIRUBIN URINE: NEGATIVE
BLOOD URINE: NEGATIVE
COLOR: YELLOW
GLUCOSE QUALITATIVE U: NEGATIVE
KETONES URINE: NORMAL
LEUKOCYTE ESTERASE URINE: NEGATIVE
NITRITE URINE: NEGATIVE
PH URINE: 5.5
PROTEIN URINE: NORMAL
SPECIFIC GRAVITY URINE: 1.03
UROBILINOGEN URINE: NORMAL

## 2021-08-09 LAB — BACTERIA UR CULT: NORMAL

## 2021-08-13 LAB
MYCOPLASMA HOMINIS CULTURE: NEGATIVE
UREAPLASMA CULTURE: NEGATIVE

## 2021-08-16 ENCOUNTER — APPOINTMENT (OUTPATIENT)
Dept: NEUROSURGERY | Facility: CLINIC | Age: 51
End: 2021-08-16
Payer: COMMERCIAL

## 2021-08-16 PROCEDURE — 99441: CPT

## 2021-08-17 NOTE — REASON FOR VISIT
[Follow-Up: _____] : a [unfilled] follow-up visit [FreeTextEntry1] : I reviewed Remedios today via a telephone consultation. She had recently seen Dr Mcghee and Dr Sher gustafson at [facility] to discuss her options for management for her recurrent right sided trigeminal neuralgia. I also had an opportunity to go over her pertinent imaging with one of our neuroradiologist, Dr Donald Cordova. To summarize - she reportedly was told that there was no safe/ effective surgical option for her pain and that repeat Gamma knife was not recommended nor microsurgery. 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 We discussed her seeing a pain provider for an opinion. We will also discuss her case with some of my neurosurgery skull base coleagues and review her management options, which in my opinion may include a microvascular neurolysis or partial nerve section.

## 2021-08-23 ENCOUNTER — APPOINTMENT (OUTPATIENT)
Dept: NEUROSURGERY | Facility: CLINIC | Age: 51
End: 2021-08-23
Payer: COMMERCIAL

## 2021-08-23 PROCEDURE — 99213 OFFICE O/P EST LOW 20 MIN: CPT

## 2021-08-23 NOTE — REASON FOR VISIT
[Follow-Up: _____] : a [unfilled] follow-up visit [FreeTextEntry1] : Remedios returned for follow up with her  to discuss her options for the management of refractory trigeminal neuralgia. Clearly  this is a complex case. We went over the note from her visit to [facility]. I had the opportunity to discuss her scans and management with our neuroradiologist, as well as Dr D'Amico and Dr Gaxiola, my colleagues from [facility] and [facility]. Distortion of Meckle's cave would make percutaneous rhizotomy challenging and higher risk, and her expert opinion from [facility] from Dr Sarabia was against the idea of repeat gamma knife. We discussed the option of MVD from the right sided retrosigmoid approach to perform either MVD if a vessel is located, or neurolysis if the nerve can be stately located. We discussed the risks of ipsilateral deafness and facial nerve damage. We mentioned that she should be in the driving seat with regards to decision making. We went over the films from her recent MRI together with the patient in detail. \par \par We also mentioned facial nerve stimulator trial which she reportedly has already tried and found to be unsuccessful. I do believe that her current symptoms are a combination of true trigeminal neuralgia type intermittent shock like pains and some element of neuropathic pain, particularly along her temporal region on the right. I was clear that the pain we would hope to address with the retrosigmoid approach would be the neuralgia type.\par \par They are scheduled to see a pain doctor and will regroup with my office once they have thought things over.

## 2021-08-25 ENCOUNTER — APPOINTMENT (OUTPATIENT)
Dept: NEUROSURGERY | Facility: CLINIC | Age: 51
End: 2021-08-25

## 2021-09-26 ENCOUNTER — TRANSCRIPTION ENCOUNTER (OUTPATIENT)
Age: 51
End: 2021-09-26

## 2022-01-06 ENCOUNTER — APPOINTMENT (OUTPATIENT)
Dept: NEUROLOGY | Facility: CLINIC | Age: 52
End: 2022-01-06

## 2022-03-31 ENCOUNTER — APPOINTMENT (OUTPATIENT)
Dept: NEUROSURGERY | Facility: CLINIC | Age: 52
End: 2022-03-31
Payer: COMMERCIAL

## 2022-03-31 VITALS — BODY MASS INDEX: 23.41 KG/M2 | WEIGHT: 124 LBS | HEIGHT: 61 IN

## 2022-03-31 DIAGNOSIS — R22.0 LOCALIZED SWELLING, MASS AND LUMP, HEAD: ICD-10-CM

## 2022-03-31 PROCEDURE — 99213 OFFICE O/P EST LOW 20 MIN: CPT

## 2022-04-05 NOTE — END OF VISIT
[FreeTextEntry3] : I personally reviewed this patient and agree with the findings as documented in the ACP note.  Time spent during consultation was approximately 20 minutes.

## 2022-04-05 NOTE — HISTORY OF PRESENT ILLNESS
[FreeTextEntry1] : Ms. Chance, hx of resection of ipsilateral petroclival meningoma in 2015 at [facility], c/b with right trigeminal neuropathy and neuralgia, presents today after seeing Dr. Ugalde and receiving migraine medication ( injection last given in February 2022) and medicinal marijuana, which has been ineffective for the right facial pain which she continues to have on RVI, and RV3 ( which is the worse pain)\par \par Today she reports of new symptoms which started about 2 months: right facial swelling, pressure behind the right eye and ear. She went to the dentist, had a filling done, and also had a CT of her teeth, and was told the swelling is not originating from her mouth/teeth. \par \par She is here to discuss treatment option.

## 2022-05-15 ENCOUNTER — RESULT REVIEW (OUTPATIENT)
Age: 52
End: 2022-05-15

## 2022-05-15 ENCOUNTER — OUTPATIENT (OUTPATIENT)
Dept: OUTPATIENT SERVICES | Facility: HOSPITAL | Age: 52
LOS: 1 days | Discharge: HOME | End: 2022-05-15
Payer: COMMERCIAL

## 2022-05-15 DIAGNOSIS — R22.0 LOCALIZED SWELLING, MASS AND LUMP, HEAD: ICD-10-CM

## 2022-05-15 DIAGNOSIS — Z90.711 ACQUIRED ABSENCE OF UTERUS WITH REMAINING CERVICAL STUMP: Chronic | ICD-10-CM

## 2022-05-15 PROCEDURE — 70543 MRI ORBT/FAC/NCK W/O &W/DYE: CPT | Mod: 26

## 2022-05-15 PROCEDURE — 70553 MRI BRAIN STEM W/O & W/DYE: CPT | Mod: 26

## 2022-05-19 NOTE — ED PROVIDER NOTE - PROGRESS NOTE DETAILS
Boojose Aus  1956  male  60 y.o. SUBJECTIVE:       Chief Complaint   Patient presents with    Established New Doctor     from [facility]       HPI:  Patient also established with me. Patient report history of myocardial infarction about 2 years ago while in [place].  He reports to me at that time he had chest pain, shortness of breath as well as leg swellings. His current medication have been reviewed. He is currently taking Lasix and Aldactone only over the weekend. He feels urinary frequency while taking water pill and working. Patient denied any current chest pain palpitation dizziness exertional shortness of breath. However at night he noticed some dry cough as well as occasional shortness of breath. He is physically very active without any shortness of breath or chest pain. History of gastroesophageal reflux as well as hiatal hernia. He does not take any medication at present. Patient is requesting to have a colonoscopy done with the physician located in  [facility] area. Past Medical History:   Diagnosis Date    MI (myocardial infarction) ([facility] [address]      History reviewed. No pertinent surgical history.   Social History     Socioeconomic History    Marital status:      Spouse name: None    Number of children: None    Years of education: None    Highest education level: None   Occupational History    None   Tobacco Use    Smoking status: Never Smoker    Smokeless tobacco: Never Used   Vaping Use    Vaping Use: Never used   Substance and Sexual Activity    Alcohol use: Never    Drug use: Never    Sexual activity: Never   Other Topics Concern    None   Social History Narrative    None     Social Determinants of Health     Financial Resource Strain:     Difficulty of Paying Living Expenses: Not on file   Food Insecurity:     Worried About Running Out of Food in the Last Year: Not on file    Nathan of Food in the Last Year: Not on file   Transportation Needs: YESENIA: stroke code called.  Lack of Transportation (Medical): Not on file    Lack of Transportation (Non-Medical): Not on file   Physical Activity:     Days of Exercise per Week: Not on file    Minutes of Exercise per Session: Not on file   Stress:     Feeling of Stress : Not on file   Social Connections:     Frequency of Communication with Friends and Family: Not on file    Frequency of Social Gatherings with Friends and Family: Not on file    Attends [employer] Services: Not on file    Active Member of [address] [place] [address] or Organizations: Not on file    Attends Club or Organization Meetings: Not on file    Marital Status: Not on file   Intimate Partner Violence:     Fear of Current or Ex-Partner: Not on file    Emotionally Abused: Not on file    Physically Abused: Not on file    Sexually Abused: Not on file   Housing Stability:     Unable to Pay for Housing in the Last Year: Not on file    Number of [place] in the Last Year: Not on file    Unstable Housing in the Last Year: Not on file     History reviewed. No pertinent family history. Review of Systems   Constitutional: Negative for diaphoresis and unexpected weight change. HENT: Negative for trouble swallowing and voice change. Eyes: Negative for photophobia and visual disturbance. Respiratory: Negative for chest tightness and wheezing. Occasional night cough and shortness of breath when lying down  Also have some reflux symptoms at night   Cardiovascular: Negative for chest pain, palpitations and leg swelling. Gastrointestinal: Negative for abdominal pain, nausea and vomiting. Endocrine: Negative for polyphagia and polyuria. Genitourinary: Negative for difficulty urinating, frequency and urgency. Neurological: Negative for dizziness and light-headedness.        OBJECTIVE:  Pulse Readings from Last 4 Encounters:   05/19/22 83   02/08/22 91     Wt Readings from Last 4 Encounters:   05/19/22 209 lb (94.8 kg)   02/08/22 200 lb 9.6 oz (91 kg)     BP Readings from Last 4 Encounters:   05/19/22 130/88   02/08/22 105/67     Physical Exam  Vitals and nursing note reviewed. Constitutional:       Appearance: Normal appearance. He is not ill-appearing or diaphoretic. Eyes:      General: No scleral icterus. Conjunctiva/sclera: Conjunctivae normal.   Neck:      Vascular: No carotid bruit. Cardiovascular:      Rate and Rhythm: Normal rate and regular rhythm. Pulses: Normal pulses. Heart sounds: Normal heart sounds. Pulmonary:      Effort: Pulmonary effort is normal.      Breath sounds: Normal breath sounds. No wheezing or rhonchi. Abdominal:      General: Bowel sounds are normal.      Tenderness: There is no abdominal tenderness. There is no guarding or rebound. Musculoskeletal:      Right lower leg: No edema. Left lower leg: No edema. Neurological:      General: No focal deficit present. Mental Status: He is alert and oriented to person, place, and time. Psychiatric:         Mood and Affect: Mood normal.         Behavior: Behavior normal.         CBC:   Lab Results   Component Value Date    WBC 8.1 02/08/2022    HGB 13.7 02/08/2022    HCT 41.5 02/08/2022     02/08/2022     CMP:  Lab Results   Component Value Date     02/08/2022    K 4.5 02/08/2022     02/08/2022    CO2 23 02/08/2022    ANIONGAP 11 02/08/2022    GLUCOSE 109 02/08/2022    BUN 16 02/08/2022    CREATININE 1.1 02/08/2022    GFRAA >60 02/08/2022    CALCIUM 9.8 02/08/2022    PROT 7.6 02/08/2022    LABALBU 3.9 02/08/2022    AGRATIO 1.1 02/08/2022    BILITOT 1.0 02/08/2022    ALKPHOS 119 02/08/2022    ALT 16 02/08/2022    AST 22 02/08/2022       Patient brought copy of previous EKG. Also have report of upper endoscopy. Also have some previous blood work. Most of the report have been reviewed. ASSESSMENT/PLAN:  Assessment/Plan:  Lizbeth Lynn was seen today for established new doctor.     Diagnoses and all orders for this visit:    Ischemic cardiomyopathy  - CBC; Future  -     Comprehensive Metabolic Panel; Future  -     TSH with Reflex; Future  -     PSA Screening; Future  -     Lipid Panel; Future  -     Vielka Redd MD, Cardiology, [facility]  -     Echocardiogram complete; Future  -     Brain Natriuretic Peptide; Future  Patient is on Lasix and Aldactone only over the weekend  Nocturnal dyspnea    Not sure related to congestive heart failure due to ischemic cardiopathy. Or could be associated with GERD at night  He will take Pepcid at night. Hiatal hernia    Gastroesophageal reflux disease with esophagitis without hemorrhage  Pepcid  Diet lifestyle changes. Screening for prostate cancer  -     PSA Screening; Future    Screen for colon cancer  -     External Referral To Gastroenterology    Coronary artery disease due to lipid rich plaque  Patient is requesting cardiologist at the [facility]; Future  -     Comprehensive Metabolic Panel; Future  -     TSH with Reflex; Future  -     Vielka Redd MD, Cardiology, [facility]    Primary hypertension  -     CBC; Future  -     Comprehensive Metabolic Panel; Future  -     TSH with Reflex; Future  -     Lipid Panel; Future  -     Urinalysis; Future    Language barrier to communication  History physical done in presence of telemetry interpretative servic3  Other orders  -     famotidine (PEPCID) 20 MG tablet;  Take 1 tablet by mouth nightly as needed (cough and heart burn)          Orders Placed This Encounter   Procedures    CBC     Standing Status:   Future     Number of Occurrences:   1     Standing Expiration Date:   5/19/2023    Comprehensive Metabolic Panel     Standing Status:   Future     Number of Occurrences:   1     Standing Expiration Date:   5/19/2023    TSH with Reflex     Standing Status:   Future     Number of Occurrences:   1     Standing Expiration Date:   5/19/2023    PSA Screening     Standing Status:   Future     Number of Occurrences:   1     Standing Expiration Date:   5/19/2023    Lipid Panel     Standing Status:   Future     Number of Occurrences:   1     Standing Expiration Date:   5/19/2023     Order Specific Question:   Is Patient Fasting?/# of Hours     Answer:   10    Urinalysis     Standing Status:   Future     Number of Occurrences:   1     Standing Expiration Date:   5/19/2023    Brain Natriuretic Peptide     Standing Status:   Future     Number of Occurrences:   1     Standing Expiration Date:   5/19/2023   Cyndi Pederson MD, Cardiology, [facility]     Referral Priority:   Routine     Referral Type:   Eval and Treat     Referral Reason:   Specialty Services Required     Referred to Provider:   Cathy Palacio MD     Requested Specialty:   Cardiology     Number of Visits Requested:   1    External Referral To Gastroenterology     Referral Priority:   Routine     Referral Type:   Eval and Treat     Referral Reason:   Specialty Services Required     Requested Specialty:   Gastroenterology     Number of Visits Requested:   1    Echocardiogram complete     Standing Status:   Future     Standing Expiration Date:   7/18/2022     Order Specific Question:   Reason for exam:     Answer:   chf     Current Outpatient Medications   Medication Sig Dispense Refill    furosemide (LASIX) 40 MG tablet  Taking only weekend Take 40 mg by mouth 2 times daily      spironolactone (ALDACTONE) 25 MG tablet  Taking only weekend Take 25 mg by mouth daily      carvedilol (COREG) 12.5 MG tablet Take 12.5 mg by mouth 2 times daily (with meals)      rosuvastatin (CRESTOR) 20 MG tablet Take 20 mg by mouth daily      Cyanocobalamin ([employer] B-12 PO) Take by mouth      famotidine (PEPCID) 20 MG tablet Take 1 tablet by mouth nightly as needed (cough and heart burn) 90 tablet 0    clopidogrel (PLAVIX) 75 MG tablet Take 75 mg by mouth daily       No current facility-administered medications for this visit. Return in about 6 weeks (around 6/30/2022). Nocturnal dyspnea, cough, review report  An After Visit Summary was printed and given to the patient. Documentation was done using voice recognition dragon software. Every effort was made to ensure accuracy; however, inadvertent  Unintentional computerized transcription errors may be present. YESENIA: neuro at bedside, requesting perfusion study. CT head noncon negative for acute bleeding, shift, ischemia per radiology. YESENIA: admit to stroke unit per neurology

## 2022-05-20 ENCOUNTER — APPOINTMENT (OUTPATIENT)
Dept: NEUROSURGERY | Facility: CLINIC | Age: 52
End: 2022-05-20
Payer: COMMERCIAL

## 2022-05-20 PROCEDURE — 99212 OFFICE O/P EST SF 10 MIN: CPT

## 2022-06-02 ENCOUNTER — APPOINTMENT (OUTPATIENT)
Dept: NEUROLOGY | Facility: CLINIC | Age: 52
End: 2022-06-02
Payer: COMMERCIAL

## 2022-06-02 VITALS
HEIGHT: 61 IN | BODY MASS INDEX: 23.6 KG/M2 | SYSTOLIC BLOOD PRESSURE: 109 MMHG | DIASTOLIC BLOOD PRESSURE: 70 MMHG | HEART RATE: 71 BPM | OXYGEN SATURATION: 98 % | WEIGHT: 125 LBS

## 2022-06-02 PROCEDURE — 99214 OFFICE O/P EST MOD 30 MIN: CPT

## 2022-06-02 RX ORDER — PREGABALIN 150 MG/1
150 CAPSULE ORAL
Qty: 60 | Refills: 5 | Status: DISCONTINUED | COMMUNITY
End: 2022-06-02

## 2022-06-02 NOTE — HISTORY OF PRESENT ILLNESS
[FreeTextEntry1] : Patient is here for followup.\par She reports since last visit, she has been having 1 migraine attack per week. Has been on aimovig 70 mg sc for almost 3 months. She continues to take Topamax 200 mg q 12h, and neuralgia seems to be stable on Baclofen 10 mg. She has stopped Lyrica for the past couple months, with no increase in frequency or intensity of her neuralgia.  Baclofen 10 mg TID helps alleviate severe pain but also makes her groggy.  Otherwise, no new complains. She followed with Dr Monroe recently. MR brain with post surgical findings and MR orbit was unremarkable.

## 2022-06-02 NOTE — REVIEW OF SYSTEMS
[As Noted in HPI] : as noted in HPI [Negative] : Heme/Lymph [Eye Pain] : no eye pain [Eyesight Problems] : no eyesight problems

## 2022-06-02 NOTE — ASSESSMENT
[FreeTextEntry1] : 51 yo RHF w/ h/o migraines, trigeminal neuralgia after clival meningioma resection complicated by right fourth and fifth nerve palsies with right temporal pain with recurrent trigeminal neuralgia s/p Gamma Knife therapy recently diagnosed with RA , here for followup for migraine HA and Trigeminal neuralgia.  Will change Baclofen to lower dose given more frequently to offset side effects.  In meantime will increase Aimovig dosage.  \par \par Plan:\par - Continue Topamax 200 mg b.i.d.\par - decrease Baclofen 5mg q6h PRN\par - c/w sumatriptan 50 mg with Naprosyn 500 mg PRN\par - Increase Aimovig to 140 mg sc monthly\par - discontinue lyrica\par - f/u w/ Rheumatology for RA treatments\par - RTC 6 months. \par

## 2022-06-02 NOTE — PHYSICAL EXAM
[FreeTextEntry1] : NAD. AOx3. Intact memory. Speech fluent, nondysarthric. \par CN 2 – 12 Right ptosis otherwise normal. decrease sensation over V1-V2 branches of the trigeminal N\par No facial tenderness to palpation or allodynia. \par Strength 5/5 RUE, b/l LE. (-) TTP or allodynia R temporal.\par  DTRs 2+ throughout. Plantar response downgoing b/l. (-) Hoffmans, clonus. \par Sensory intact LT/PP, pain, temp, proprioception and vibration.\par  NL FTN/HKS. No dysdiadokinesia. Gait narrow based/NL tandem. \par  \par

## 2022-06-09 ENCOUNTER — RX RENEWAL (OUTPATIENT)
Age: 52
End: 2022-06-09

## 2022-06-26 NOTE — END OF VISIT
[FreeTextEntry3] : I reviewed this pleasant lady in the neurosurgery clinic today.  His symptoms remain fairly unchanged.  I discussed with her the fact that the MRI scan of her brain that I personally reviewed does not reveal any obvious surgical target for etiology for her symptoms.  I do not think that radiosurgery and or glycerol injection or microsurgical decompression were viable options to help with her symptoms in terms of the risk-benefit profile.  She should continue local therapy and or medical therapy/pain management.\par \par Time spent during consultation was approximately 15 minutes.

## 2022-06-26 NOTE — HISTORY OF PRESENT ILLNESS
[FreeTextEntry1] : Ms. Chance, [facility] resection of meningioma in 2015 ([facility]) c/b trigeminal neuropathy and neuralgia, presents today to review brain MRi. Her pain consistent of right facial pain on V1 and V3 distribution, worse is on the lips. \par She has tried and failed oral medications and injections with Dr. Ugalde \par Currently on medicinal marijuana (SL oil) which makes her drowsy, \par \par MRI of orbits, face, neck w/w/o contrast & brain done on 5/15 showed s/p meningioma postsurgical , stable. stable appearance of the right trigeminal nerve

## 2022-08-29 ENCOUNTER — APPOINTMENT (OUTPATIENT)
Dept: RHEUMATOLOGY | Facility: CLINIC | Age: 52
End: 2022-08-29

## 2022-08-29 ENCOUNTER — RX RENEWAL (OUTPATIENT)
Age: 52
End: 2022-08-29

## 2022-08-29 VITALS
SYSTOLIC BLOOD PRESSURE: 112 MMHG | HEART RATE: 72 BPM | WEIGHT: 125 LBS | BODY MASS INDEX: 23.6 KG/M2 | OXYGEN SATURATION: 99 % | HEIGHT: 61 IN | DIASTOLIC BLOOD PRESSURE: 68 MMHG

## 2022-08-29 PROCEDURE — 99204 OFFICE O/P NEW MOD 45 MIN: CPT

## 2022-08-29 RX ORDER — ALPRAZOLAM 1 MG/1
1 TABLET ORAL
Refills: 0 | Status: DISCONTINUED | COMMUNITY
End: 2022-08-29

## 2022-08-29 RX ORDER — METHYLPREDNISOLONE 4 MG/1
4 TABLET ORAL
Qty: 1 | Refills: 2 | Status: DISCONTINUED | COMMUNITY
Start: 2020-09-25 | End: 2022-08-29

## 2022-08-29 RX ORDER — LEVOCETIRIZINE DIHYDROCHLORIDE 5 MG/1
5 TABLET ORAL DAILY
Qty: 30 | Refills: 3 | Status: DISCONTINUED | COMMUNITY
Start: 2020-10-08 | End: 2022-08-29

## 2022-08-29 RX ORDER — OMEPRAZOLE 10 MG/1
10 CAPSULE, DELAYED RELEASE ORAL
Qty: 30 | Refills: 2 | Status: DISCONTINUED | COMMUNITY
Start: 2020-09-25 | End: 2022-08-29

## 2022-08-29 RX ORDER — METHYLPREDNISOLONE 4 MG/1
4 TABLET ORAL
Qty: 1 | Refills: 0 | Status: DISCONTINUED | COMMUNITY
Start: 2020-10-08 | End: 2022-08-29

## 2022-08-29 RX ORDER — ETANERCEPT 50 MG/ML
50 SOLUTION SUBCUTANEOUS
Refills: 0 | Status: DISCONTINUED | COMMUNITY
End: 2022-08-29

## 2022-09-07 RX ORDER — SARILUMAB 200 MG/1.14ML
200 INJECTION, SOLUTION SUBCUTANEOUS
Qty: 1 | Refills: 5 | Status: DISCONTINUED | COMMUNITY
Start: 2022-08-29 | End: 2022-09-07

## 2022-09-07 NOTE — HISTORY OF PRESENT ILLNESS
[FreeTextEntry1] : 51 year old female here for evaluation of RA. She was diagnosed 1.5 years ago after developing leg cramps and serologies suggested +RF and CCP. She was started on MTX SQ that helped, and then transitioned to MTX PO pills up to 8 tabs a week but had side effect of nausea, vomiting and headaches. She was placed on ?enbrel but it didn’t work. Recently she was started on Kevzara by Dr. Hsu had 1 injection and developed injection site reaction. Also started on prednisone 15 mg x 5 days, 10 mg x 5 days, 5 mg x 5 days. Started on diclofenac 75 mg BID as well helped with her hands not her knees. Reports pain in hands, knees, feet, neck and right shoulder, 1 hour AM stiffness and hand swelling. Has dry eye follows with ophtho

## 2022-09-07 NOTE — ASSESSMENT
[FreeTextEntry1] : Seropositive rheumatoid arthritis\par -Retrieve x-rays from [facility]ology\par -CCP and RF positive\par -Failed MTX and Enbrel\par -Started on kevzara s/p 1 dose. Prescribed Kevzara 200 mg SQ q 14 days\par -Continue prednisone taper\par -Check CBC, CMP, ESR, CRP, hepatitis B and C serologies, TB in 1 month\par \par Knee pain\par -Benign exam check  knee x-rays. ? RA vs. OA. Continue diclofenac 75 mg BID PRB\par \par \par 9/7/22: Insurance is not covering kevzara start Humira instead I spoke with patient to relay this to her and she agrees.

## 2022-09-15 LAB
BASOPHILS # BLD AUTO: 0.02 K/UL
BASOPHILS NFR BLD AUTO: 0.5 %
EOSINOPHIL # BLD AUTO: 0.09 K/UL
EOSINOPHIL NFR BLD AUTO: 2.2 %
HCT VFR BLD CALC: 40.9 %
HGB BLD-MCNC: 13.2 G/DL
IMM GRANULOCYTES NFR BLD AUTO: 0 %
LYMPHOCYTES # BLD AUTO: 1.8 K/UL
LYMPHOCYTES NFR BLD AUTO: 44.1 %
MAN DIFF?: NORMAL
MCHC RBC-ENTMCNC: 30.6 PG
MCHC RBC-ENTMCNC: 32.3 G/DL
MCV RBC AUTO: 94.7 FL
MONOCYTES # BLD AUTO: 0.3 K/UL
MONOCYTES NFR BLD AUTO: 7.4 %
NEUTROPHILS # BLD AUTO: 1.87 K/UL
NEUTROPHILS NFR BLD AUTO: 45.8 %
PLATELET # BLD AUTO: 204 K/UL
RBC # BLD: 4.32 M/UL
RBC # FLD: 11.9 %
WBC # FLD AUTO: 4.08 K/UL

## 2022-09-19 LAB
ALBUMIN SERPL ELPH-MCNC: 4.8 G/DL
ALP BLD-CCNC: 61 U/L
ALT SERPL-CCNC: 18 U/L
ANION GAP SERPL CALC-SCNC: 12 MMOL/L
AST SERPL-CCNC: 25 U/L
BILIRUB SERPL-MCNC: 1.3 MG/DL
BUN SERPL-MCNC: 17 MG/DL
CALCIUM SERPL-MCNC: 10.4 MG/DL
CHLORIDE SERPL-SCNC: 101 MMOL/L
CO2 SERPL-SCNC: 28 MMOL/L
CREAT SERPL-MCNC: 0.7 MG/DL
CRP SERPL-MCNC: <3 MG/L
EGFR: 105 ML/MIN/1.73M2
ERYTHROCYTE [SEDIMENTATION RATE] IN BLOOD BY WESTERGREN METHOD: 10 MM/HR
GLUCOSE SERPL-MCNC: 90 MG/DL
HBV CORE IGG+IGM SER QL: NONREACTIVE
HBV CORE IGM SER QL: NONREACTIVE
HBV SURFACE AB SER QL: NONREACTIVE
HBV SURFACE AG SER QL: NONREACTIVE
HCV AB SER QL: NONREACTIVE
HCV S/CO RATIO: 0.09 S/CO
M TB IFN-G BLD-IMP: NEGATIVE
POTASSIUM SERPL-SCNC: 4.6 MMOL/L
PROT SERPL-MCNC: 7 G/DL
QUANTIFERON TB PLUS MITOGEN MINUS NIL: 5.22 IU/ML
QUANTIFERON TB PLUS NIL: 0.01 IU/ML
QUANTIFERON TB PLUS TB1 MINUS NIL: 0 IU/ML
QUANTIFERON TB PLUS TB2 MINUS NIL: 0 IU/ML
SODIUM SERPL-SCNC: 141 MMOL/L

## 2022-09-26 ENCOUNTER — APPOINTMENT (OUTPATIENT)
Dept: RHEUMATOLOGY | Facility: CLINIC | Age: 52
End: 2022-09-26

## 2022-09-26 VITALS
BODY MASS INDEX: 23.6 KG/M2 | DIASTOLIC BLOOD PRESSURE: 81 MMHG | HEIGHT: 61 IN | HEART RATE: 93 BPM | SYSTOLIC BLOOD PRESSURE: 112 MMHG | WEIGHT: 125 LBS

## 2022-09-26 DIAGNOSIS — M06.9 RHEUMATOID ARTHRITIS, UNSPECIFIED: ICD-10-CM

## 2022-09-26 PROCEDURE — 99214 OFFICE O/P EST MOD 30 MIN: CPT

## 2022-09-26 NOTE — PHYSICAL EXAM
[General Appearance - Alert] : alert [General Appearance - In No Acute Distress] : in no acute distress [Sclera] : the sclera and conjunctiva were normal [PERRL With Normal Accommodation] : pupils were equal in size, round, and reactive to light [Extraocular Movements] : extraocular movements were intact [Outer Ear] : the ears and nose were normal in appearance [Oropharynx] : the oropharynx was normal [Neck Appearance] : the appearance of the neck was normal [Neck Cervical Mass (___cm)] : no neck mass was observed [Jugular Venous Distention Increased] : there was no jugular-venous distention [Thyroid Diffuse Enlargement] : the thyroid was not enlarged [Thyroid Nodule] : there were no palpable thyroid nodules [Auscultation Breath Sounds / Voice Sounds] : lungs were clear to auscultation bilaterally [Heart Rate And Rhythm] : heart rate was normal and rhythm regular [Heart Sounds] : normal S1 and S2 [Heart Sounds Gallop] : no gallops [Murmurs] : no murmurs [Heart Sounds Pericardial Friction Rub] : no pericardial rub [Bowel Sounds] : normal bowel sounds [Abdomen Soft] : soft [Abdomen Tenderness] : non-tender [Abdomen Mass (___ Cm)] : no abdominal mass palpated [FreeTextEntry1] : lower lumbar paraspinal tenderness [Abnormal Walk] : normal gait [Nail Clubbing] : no clubbing  or cyanosis of the fingernails [Musculoskeletal - Swelling] : no joint swelling seen [Motor Tone] : muscle strength and tone were normal [] : no rash [Affect] : the affect was normal [Mood] : the mood was normal

## 2022-09-26 NOTE — ASSESSMENT
[FreeTextEntry1] : Seropositive rheumatoid arthritis\par -Retrieve x-rays from [facility] radiology\par -CCP and RF positive\par -Failed MTX and Enbrel\par -S/p kevzara samples\par -Humira started awaiting medication\par \par \par Lumbar and thoracic back pain\par -Possible muscle strain vs. degenerative arthritis\par -Does not want PO medications kindly declined\par -Start physical therapy\par -If no improvement will consider x-ray's\par \par Knee pain\par -Benign exam check  knee x-rays. ? RA vs. OA. Continue diclofenac 75 mg BID PRN\par \par F/u 6 weeks\par \par \par

## 2022-09-26 NOTE — HISTORY OF PRESENT ILLNESS
[FreeTextEntry1] : 9/26/22:\par Reports abrupt onset low back pain and then developed upper thoracic pain that started 1 week ago no injuries or trauma. She woke up and couldn't get out of bed. Pain was initially constant, 7/10, then went to chiropractor, after a few days got better. After sitting needs to get up and walk around, and feels soreness in the lower back. Sitting makes it worse. No radiation of symptoms. No morning stiffness. Pain is intermittent. Take a deep breath causes pain in the back. Was taking flexeril for the low back and didn't help so she stopped. She has no joint pains, +AM stiffness for a few hours uses copper fit gloves, and feet get swollen. Ibuprofen sometimes helps. On prednisone was still having joint pains. She will get Humira next week. \par \par Initial visit:\par 51 year old female here for evaluation of RA. She was diagnosed 1.5 years ago after developing leg cramps and serologies suggested +RF and CCP. She was started on MTX SQ that helped, and then transitioned to MTX PO pills up to 8 tabs a week but had side effect of nausea, vomiting and headaches. She was placed on ?enbrel but it didn’t work. Recently she was started on Kevzara by Dr. Hsu had 1 injection and developed injection site reaction. Also started on prednisone 15 mg x 5 days, 10 mg x 5 days, 5 mg x 5 days. Started on diclofenac 75 mg BID as well helped with her hands not her knees. Reports pain in hands, knees, feet, neck and right shoulder, 1 hour AM stiffness and hand swelling. Has dry eye follows with ophtho

## 2022-11-01 ENCOUNTER — APPOINTMENT (OUTPATIENT)
Dept: RHEUMATOLOGY | Facility: CLINIC | Age: 52
End: 2022-11-01

## 2022-12-09 ENCOUNTER — NON-APPOINTMENT (OUTPATIENT)
Age: 52
End: 2022-12-09

## 2023-09-14 ENCOUNTER — APPOINTMENT (OUTPATIENT)
Dept: NEUROLOGY | Facility: CLINIC | Age: 53
End: 2023-09-14
Payer: COMMERCIAL

## 2023-09-14 VITALS
DIASTOLIC BLOOD PRESSURE: 72 MMHG | HEIGHT: 61 IN | WEIGHT: 130 LBS | SYSTOLIC BLOOD PRESSURE: 107 MMHG | OXYGEN SATURATION: 98 % | BODY MASS INDEX: 24.55 KG/M2 | TEMPERATURE: 98 F | HEART RATE: 64 BPM

## 2023-09-14 DIAGNOSIS — R11.2 NAUSEA WITH VOMITING, UNSPECIFIED: ICD-10-CM

## 2023-09-14 PROCEDURE — 99214 OFFICE O/P EST MOD 30 MIN: CPT

## 2023-09-14 RX ORDER — SUMATRIPTAN 50 MG/1
50 TABLET, FILM COATED ORAL
Qty: 10 | Refills: 5 | Status: DISCONTINUED | COMMUNITY
Start: 2021-06-24 | End: 2023-09-14

## 2023-09-14 RX ORDER — IBUPROFEN 800 MG/1
800 TABLET, FILM COATED ORAL 3 TIMES DAILY
Qty: 90 | Refills: 2 | Status: DISCONTINUED | COMMUNITY
Start: 1900-01-01 | End: 2023-09-14

## 2023-09-14 RX ORDER — ADALIMUMAB 40MG/0.4ML
40 KIT SUBCUTANEOUS
Qty: 1 | Refills: 2 | Status: DISCONTINUED | COMMUNITY
Start: 2022-09-07 | End: 2023-09-14

## 2023-09-14 RX ORDER — ERENUMAB-AOOE 140 MG/ML
140 INJECTION, SOLUTION SUBCUTANEOUS
Qty: 140 | Refills: 3 | Status: DISCONTINUED | COMMUNITY
Start: 2022-06-02 | End: 2023-09-14

## 2023-09-14 RX ORDER — BACLOFEN 10 MG/1
10 TABLET ORAL 3 TIMES DAILY
Qty: 90 | Refills: 2 | Status: DISCONTINUED | COMMUNITY
Start: 2019-09-12 | End: 2023-09-14

## 2023-09-25 ENCOUNTER — NON-APPOINTMENT (OUTPATIENT)
Age: 53
End: 2023-09-25

## 2024-02-15 ENCOUNTER — APPOINTMENT (OUTPATIENT)
Dept: NEUROLOGY | Facility: CLINIC | Age: 54
End: 2024-02-15
Payer: COMMERCIAL

## 2024-02-15 VITALS
WEIGHT: 140 LBS | BODY MASS INDEX: 26.43 KG/M2 | SYSTOLIC BLOOD PRESSURE: 129 MMHG | DIASTOLIC BLOOD PRESSURE: 73 MMHG | HEIGHT: 61 IN | HEART RATE: 73 BPM

## 2024-02-15 DIAGNOSIS — G43.909 MIGRAINE, UNSPECIFIED, NOT INTRACTABLE, W/OUT STATUS MIGRAINOSUS: ICD-10-CM

## 2024-02-15 DIAGNOSIS — M25.50 PAIN IN UNSPECIFIED JOINT: ICD-10-CM

## 2024-02-15 DIAGNOSIS — G50.0 TRIGEMINAL NEURALGIA: ICD-10-CM

## 2024-02-15 DIAGNOSIS — M54.2 CERVICALGIA: ICD-10-CM

## 2024-02-15 PROCEDURE — G2211 COMPLEX E/M VISIT ADD ON: CPT

## 2024-02-15 PROCEDURE — 99214 OFFICE O/P EST MOD 30 MIN: CPT

## 2024-02-15 NOTE — HISTORY OF PRESENT ILLNESS
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  Since last visit: Migraines are still present w/ n/v photophobia, phonophobia at least once a week and they last a day. The headaches typically start on the R side of the face behind the R eye. Started Nurtec 75 mg every other day at last visit but reports it has not taken the migraines away completely. She notes the nausea and vomiting with the migraines is severe and intolerable. She is taking baclofen about twice a week as needed for paresthesia. was supposed to be started on magnesium oxide 400 mg BID but no refills were sent so she could not take it but did not notice a difference with the magnesium. She continues to take topamax 200 mg BID. She notes the neck pain is present more often now on the L side that radiates up to the head and shoulder.   Has had chemodenervation of the R orb oculi in the past to help prevent infections of the right eye without complications from Botox.

## 2024-02-15 NOTE — ASSESSMENT
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  Migraines remain refractory despite abortive and prophylactic treatment including past use of TCAs, current and past use of ASMs, NSAIDs and CGRP antagonists.    Recommendations: - continue topamax 200 mg BID - continue nurtec 75 mg every other day - continue baclofen 5 mg q6 prn - If experiencing migraine, recommend Imitrex nasal w/ reglan (if nauseous), Naprosyn 500 mg and extra dose of Nurtec 75 mg (in this order) - consider Botox for chronic migraine given refractory migraines  - RTC for chemodenervation trial at next available appointment once insurance PA obtained

## 2024-02-15 NOTE — PHYSICAL EXAM
[FreeTextEntry1] : NAD. AOx3. Intact memory. Speech fluent, Non dysarthric. CN 2 - 12 Right ptosis mild. slight dysconjugate gaze with horizontal eye movements but improved from previous visit. R eye partial adduction defect but vertical and abduction intact. decrease sensation over V1-V3 branches of the trigeminal N No facial tenderness to palpation or allodynia. cervical paraspinal tenderness to palpation Strength 5/5 RUE, b/l LE. (-) TTP or allodynia R temporal.  DTRs 2+ throughout. Plantar response downgoing b/l. (-) Hoffmans, clonus. Sensory intact LT/PP, pain, temp, proprioception and vibration.  NL FTN/HKS. No dysdiadokinesia. Gait narrow based/NL tandem.

## 2024-03-11 ENCOUNTER — APPOINTMENT (OUTPATIENT)
Dept: NEUROLOGY | Facility: CLINIC | Age: 54
End: 2024-03-11
Payer: COMMERCIAL

## 2024-03-11 PROCEDURE — 99213 OFFICE O/P EST LOW 20 MIN: CPT | Mod: 25

## 2024-03-11 PROCEDURE — 64615 CHEMODENERV MUSC MIGRAINE: CPT

## 2024-03-11 RX ORDER — OMEGA-3/DHA/EPA/FISH OIL 300-1000MG
400 CAPSULE ORAL
Qty: 60 | Refills: 0 | Status: ACTIVE | COMMUNITY
Start: 2023-09-14 | End: 1900-01-01

## 2024-03-11 RX ADMIN — ONABOTULINUMTOXINA 1.55 UNIT: 100 INJECTION, POWDER, LYOPHILIZED, FOR SOLUTION INTRADERMAL; INTRAMUSCULAR at 00:00

## 2024-03-11 NOTE — PHYSICAL EXAM
[FreeTextEntry1] : NAD. AOx3. Intact memory. Speech fluent, Non dysarthric. CN 2 - 12 Right ptosis mild. slight dysconjugate gaze with horizontal eye movements.  R eye partial adduction defect but vertical and abduction intact. decrease sensation over V1-V3 branches of the trigeminal N No facial tenderness to palpation or allodynia. cervical paraspinal tenderness to palpation Strength 5/5 RUE, b/l LE. (-) TTP or allodynia R temporal.  DTRs 2+ throughout. Plantar response downgoing b/l. (-) Hoffmans, clonus. Sensory intact LT/PP, pain, temp, proprioception and vibration.  NL FTN/HKS. No dysdiadokinesia. Gait narrow based/NL tandem.

## 2024-03-11 NOTE — PROCEDURE
[FreeTextEntry1] : Procedure:  Botulinum toxin for chronic migraine  Risks and benefits of procedure explained.  Consent for botulinum toxin for chronic migraine signed in chart.    200 Units botulinum toxin (Lot #[id number], exp10/25; [id number], exp 3/26) reconstituted with 4 ml 0.9NS (5U/0.1cc).    Patient prepped with alcohol/ethyl chloride.  10U corrugators, 5U procerus, 20U frontalis, 20 L temporalis (R temporalis not injected due to post-surgical changes), 30 occipitalis, 20 cervical paraspinals, 30 trapezius.  Total of 155U divided in 31 sites.  45U botulinum wasted.    Patient tolerated procedure well without complications.

## 2024-03-11 NOTE — HISTORY OF PRESENT ILLNESS
[FreeTextEntry1] : Since her last visit, Ms. Chance continues to have 3 migraines per week despite prophylactic medications.  Has tried magnesium tablets which helps in addition to Nurtec.  Currently here for chemodenervation for chronic migraine.  Has had chemodenervation for the R eye when she initially had problems with closing the right eye post-operatively.  Is otherwise in her usual state of health.

## 2024-03-11 NOTE — ASSESSMENT
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  Pt tolerated chemodenervation well without complications.  Recommend continue analgesics/prophylactics in addition to abortives with Nurtec and magnesium.    Recommendations: - continue topamax 200 mg BID - continue nurtec 75 mg every other day - continue baclofen 5 mg q6 prn - If experiencing migraine, recommend Imitrex nasal w/ reglan (if nauseous), Naprosyn 500 mg and extra dose of Nurtec 75 mg (in this order) - magnesium oxide 400 mg BID PRN - RTC  in 3 months for continued chemodenervation

## 2024-03-27 RX ORDER — ONABOTULINUMTOXINA 200 [USP'U]/1
200 INJECTION, POWDER, LYOPHILIZED, FOR SOLUTION INTRADERMAL; INTRAMUSCULAR
Qty: 1 | Refills: 0 | Status: ACTIVE | COMMUNITY
Start: 2024-03-27 | End: 1900-01-01

## 2024-05-27 ENCOUNTER — NON-APPOINTMENT (OUTPATIENT)
Age: 54
End: 2024-05-27

## 2024-05-28 ENCOUNTER — APPOINTMENT (OUTPATIENT)
Dept: OBGYN | Facility: CLINIC | Age: 54
End: 2024-05-28
Payer: COMMERCIAL

## 2024-05-28 VITALS
DIASTOLIC BLOOD PRESSURE: 72 MMHG | SYSTOLIC BLOOD PRESSURE: 105 MMHG | BODY MASS INDEX: 27.56 KG/M2 | WEIGHT: 146 LBS | HEIGHT: 61 IN | HEART RATE: 83 BPM

## 2024-05-28 DIAGNOSIS — N95.1 MENOPAUSAL AND FEMALE CLIMACTERIC STATES: ICD-10-CM

## 2024-05-28 DIAGNOSIS — Z01.419 ENCOUNTER FOR GYNECOLOGICAL EXAMINATION (GENERAL) (ROUTINE) W/OUT ABNORMAL FINDINGS: ICD-10-CM

## 2024-05-28 PROCEDURE — 99396 PREV VISIT EST AGE 40-64: CPT

## 2024-05-28 PROCEDURE — 99459 PELVIC EXAMINATION: CPT

## 2024-05-28 NOTE — REVIEW OF SYSTEMS
[Fatigue] : fatigue [Night Sweats] : night sweats [Negative] : Heme/Lymph [FreeTextEntry2] : menopausal symptoms- hot flashes, weight gain, insomnia [FreeTextEntry8] : dyspareunia

## 2024-05-28 NOTE — HISTORY OF PRESENT ILLNESS
[LMP unknown] : LMP unknown [postmenopausal] : postmenopausal [N] : Patient does not use contraception [Monogamous (Male Partner)] : is monogamous with a male partner [Y] : Positive pregnancy history [PGHxTotal] : 2 [BannerxLiving] : 2 [Hot Flashes] : hot flashes [Night Sweats] : night sweats [Difficulty with Chestnut] : difficulty with intercourse [Insomnia] : insomnia [Options Discussed] : options discussed [unknown] : Patient is unsure of the date of her LMP [Currently In Menopause] : currently in menopause [Experiencing Menopausal Sxs] : experiencing menopausal symptoms [Menopause Age: ____] : age at menopause was [unfilled] [Yes] : Patient has concerns regarding sex [Currently Active] : currently active [Men] : men [Vaginal] : vaginal [No] : No [FreeTextEntry1] : painful intercourse

## 2024-05-28 NOTE — PHYSICAL EXAM
[Chaperone Present] : A chaperone was present in the examining room during all aspects of the physical examination [[id number]] : A chaperone was present during the pelvic exam. [Appropriately responsive] : appropriately responsive [Alert] : alert [No Acute Distress] : no acute distress [Soft] : soft [Non-tender] : non-tender [Non-distended] : non-distended [Oriented x3] : oriented x3 [Examination Of The Breasts] : a normal appearance [No Discharge] : no discharge [No Masses] : no breast masses were palpable [Vulvar Atrophy] : vulvar atrophy [Labia Majora] : normal [Labia Minora] : normal [Normal] : normal [Uterine Adnexae] : normal

## 2024-06-02 LAB — HPV HIGH+LOW RISK DNA PNL CVX: NOT DETECTED

## 2024-06-04 ENCOUNTER — APPOINTMENT (OUTPATIENT)
Dept: NEUROLOGY | Facility: CLINIC | Age: 54
End: 2024-06-04
Payer: COMMERCIAL

## 2024-06-04 PROCEDURE — 99213 OFFICE O/P EST LOW 20 MIN: CPT | Mod: 25

## 2024-06-04 PROCEDURE — 64615 CHEMODENERV MUSC MIGRAINE: CPT

## 2024-06-04 RX ORDER — NAPROXEN 500 MG/1
500 TABLET ORAL TWICE DAILY
Qty: 60 | Refills: 5 | Status: DISCONTINUED | COMMUNITY
Start: 2020-09-25 | End: 2024-06-04

## 2024-06-04 RX ORDER — RIMEGEPANT SULFATE 75 MG/75MG
75 TABLET, ORALLY DISINTEGRATING ORAL
Qty: 15 | Refills: 5 | Status: ACTIVE | COMMUNITY
Start: 2023-09-14 | End: 1900-01-01

## 2024-06-04 RX ORDER — BACLOFEN 5 MG/1
5 TABLET ORAL
Qty: 120 | Refills: 5 | Status: ACTIVE | COMMUNITY
Start: 2022-06-02 | End: 1900-01-01

## 2024-06-04 RX ORDER — TOPIRAMATE 200 MG/1
200 TABLET ORAL TWICE DAILY
Qty: 60 | Refills: 5 | Status: ACTIVE | COMMUNITY
Start: 1900-01-01 | End: 1900-01-01

## 2024-06-04 RX ORDER — SUMATRIPTAN 20 MG/1
20 SPRAY NASAL
Qty: 10 | Refills: 3 | Status: DISCONTINUED | COMMUNITY
End: 2024-06-04

## 2024-06-04 RX ORDER — METOCLOPRAMIDE HYDROCHLORIDE 5 MG/1
5 TABLET, ORALLY DISINTEGRATING ORAL 3 TIMES DAILY
Qty: 90 | Refills: 5 | Status: ACTIVE | COMMUNITY
Start: 2023-09-14 | End: 1900-01-01

## 2024-06-04 RX ADMIN — ONABOTULINUMTOXINA 0.78 UNIT: 200 INJECTION, POWDER, LYOPHILIZED, FOR SOLUTION INTRADERMAL; INTRAMUSCULAR at 00:00

## 2024-06-04 NOTE — PROCEDURE
[FreeTextEntry1] : Procedure: Botulinum toxin for chronic migraine  Risks and benefits of procedure explained. Consent for botulinum toxin for chronic migraine signed in chart.  200 Units botulinum toxin (Lot # [id number], exp 4/26) reconstituted with 4 ml 0.9NS (5U/0.1cc).  Patient prepped with alcohol/ethyl chloride. 10U corrugators, 5U procerus, 20U frontalis, 20 L temporalis (R temporalis not injected due to post-surgical changes), 30 occipitalis, 20 cervical paraspinals, 30 trapezius. Total of 155U divided in 31 sites. 45U botulinum wasted.  Patient tolerated procedure well without complications.

## 2024-06-04 NOTE — ASSESSMENT
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   Pt tolerated chemodenervation well without complications. Recommend continue analgesics/prophylactics in addition to abortives with Nurtec and magnesium.  Recommendations: - continue topamax 200 mg BID - continue nurtec 75 mg every other day - continue baclofen 5 mg q6 prn - magnesium oxide 400 mg BID PRN - RTC in 3 months for continued chemodenervation.

## 2024-06-04 NOTE — HISTORY OF PRESENT ILLNESS
[FreeTextEntry1] : Since her last visit, Ms. Chance responded well to her first chemodenervation with botulinum toxin currently only having 1 HA per week.  Denies any side effects to chemodenervation.  Continues to have intermittent face pain but tolerable with some uses of baclofen.  Denies any focal neurologic deficits.  No significant weight loss.

## 2024-06-08 LAB — CYTOLOGY CVX/VAG DOC THIN PREP: ABNORMAL

## 2024-06-28 ENCOUNTER — EMERGENCY (EMERGENCY)
Facility: HOSPITAL | Age: 54
LOS: 0 days | Discharge: HOME | End: 2024-06-28
Attending: STUDENT IN AN ORGANIZED HEALTH CARE EDUCATION/TRAINING PROGRAM
Payer: COMMERCIAL

## 2024-06-28 VITALS
DIASTOLIC BLOOD PRESSURE: 74 MMHG | RESPIRATION RATE: 18 BRPM | HEART RATE: 68 BPM | OXYGEN SATURATION: 99 % | TEMPERATURE: 98 F | WEIGHT: 139.99 LBS | HEIGHT: 61 IN | SYSTOLIC BLOOD PRESSURE: 111 MMHG

## 2024-06-28 DIAGNOSIS — Z90.711 ACQUIRED ABSENCE OF UTERUS WITH REMAINING CERVICAL STUMP: Chronic | ICD-10-CM

## 2024-06-28 DIAGNOSIS — R51.9 HEADACHE, UNSPECIFIED: ICD-10-CM

## 2024-06-28 DIAGNOSIS — Z86.69 PERSONAL HISTORY OF OTHER DISEASES OF THE NERVOUS SYSTEM AND SENSE ORGANS: ICD-10-CM

## 2024-06-28 DIAGNOSIS — Z87.891 PERSONAL HISTORY OF NICOTINE DEPENDENCE: ICD-10-CM

## 2024-06-28 DIAGNOSIS — Z88.8 ALLERGY STATUS TO OTHER DRUGS, MEDICAMENTS AND BIOLOGICAL SUBSTANCES: ICD-10-CM

## 2024-06-28 LAB
ALBUMIN SERPL ELPH-MCNC: 4.3 G/DL — SIGNIFICANT CHANGE UP (ref 3.5–5.2)
ALP SERPL-CCNC: 72 U/L — SIGNIFICANT CHANGE UP (ref 30–115)
ALT FLD-CCNC: 17 U/L — SIGNIFICANT CHANGE UP (ref 0–41)
ANION GAP SERPL CALC-SCNC: 9 MMOL/L — SIGNIFICANT CHANGE UP (ref 7–14)
AST SERPL-CCNC: 29 U/L — SIGNIFICANT CHANGE UP (ref 0–41)
BASOPHILS # BLD AUTO: 0.05 K/UL — SIGNIFICANT CHANGE UP (ref 0–0.2)
BASOPHILS NFR BLD AUTO: 0.9 % — SIGNIFICANT CHANGE UP (ref 0–1)
BILIRUB SERPL-MCNC: 0.4 MG/DL — SIGNIFICANT CHANGE UP (ref 0.2–1.2)
BUN SERPL-MCNC: 11 MG/DL — SIGNIFICANT CHANGE UP (ref 10–20)
CALCIUM SERPL-MCNC: 10.2 MG/DL — SIGNIFICANT CHANGE UP (ref 8.4–10.5)
CHLORIDE SERPL-SCNC: 105 MMOL/L — SIGNIFICANT CHANGE UP (ref 98–110)
CO2 SERPL-SCNC: 26 MMOL/L — SIGNIFICANT CHANGE UP (ref 17–32)
CREAT SERPL-MCNC: 0.6 MG/DL — LOW (ref 0.7–1.5)
EGFR: 108 ML/MIN/1.73M2 — SIGNIFICANT CHANGE UP
EOSINOPHIL # BLD AUTO: 0.08 K/UL — SIGNIFICANT CHANGE UP (ref 0–0.7)
EOSINOPHIL NFR BLD AUTO: 1.5 % — SIGNIFICANT CHANGE UP (ref 0–8)
GLUCOSE SERPL-MCNC: 90 MG/DL — SIGNIFICANT CHANGE UP (ref 70–99)
HCT VFR BLD CALC: 36.3 % — LOW (ref 37–47)
HGB BLD-MCNC: 12.1 G/DL — SIGNIFICANT CHANGE UP (ref 12–16)
IMM GRANULOCYTES NFR BLD AUTO: 0.4 % — HIGH (ref 0.1–0.3)
LYMPHOCYTES # BLD AUTO: 1.63 K/UL — SIGNIFICANT CHANGE UP (ref 1.2–3.4)
LYMPHOCYTES # BLD AUTO: 30.9 % — SIGNIFICANT CHANGE UP (ref 20.5–51.1)
MCHC RBC-ENTMCNC: 30.4 PG — SIGNIFICANT CHANGE UP (ref 27–31)
MCHC RBC-ENTMCNC: 33.3 G/DL — SIGNIFICANT CHANGE UP (ref 32–37)
MCV RBC AUTO: 91.2 FL — SIGNIFICANT CHANGE UP (ref 81–99)
MONOCYTES # BLD AUTO: 0.43 K/UL — SIGNIFICANT CHANGE UP (ref 0.1–0.6)
MONOCYTES NFR BLD AUTO: 8.1 % — SIGNIFICANT CHANGE UP (ref 1.7–9.3)
NEUTROPHILS # BLD AUTO: 3.07 K/UL — SIGNIFICANT CHANGE UP (ref 1.4–6.5)
NEUTROPHILS NFR BLD AUTO: 58.2 % — SIGNIFICANT CHANGE UP (ref 42.2–75.2)
NRBC # BLD: 0 /100 WBCS — SIGNIFICANT CHANGE UP (ref 0–0)
PLATELET # BLD AUTO: 229 K/UL — SIGNIFICANT CHANGE UP (ref 130–400)
PMV BLD: 9.6 FL — SIGNIFICANT CHANGE UP (ref 7.4–10.4)
POTASSIUM SERPL-MCNC: 4.8 MMOL/L — SIGNIFICANT CHANGE UP (ref 3.5–5)
POTASSIUM SERPL-SCNC: 4.8 MMOL/L — SIGNIFICANT CHANGE UP (ref 3.5–5)
PROT SERPL-MCNC: 6.6 G/DL — SIGNIFICANT CHANGE UP (ref 6–8)
RBC # BLD: 3.98 M/UL — LOW (ref 4.2–5.4)
RBC # FLD: 12.3 % — SIGNIFICANT CHANGE UP (ref 11.5–14.5)
SODIUM SERPL-SCNC: 140 MMOL/L — SIGNIFICANT CHANGE UP (ref 135–146)
WBC # BLD: 5.28 K/UL — SIGNIFICANT CHANGE UP (ref 4.8–10.8)
WBC # FLD AUTO: 5.28 K/UL — SIGNIFICANT CHANGE UP (ref 4.8–10.8)

## 2024-06-28 PROCEDURE — 99284 EMERGENCY DEPT VISIT MOD MDM: CPT

## 2024-06-28 PROCEDURE — 80053 COMPREHEN METABOLIC PANEL: CPT

## 2024-06-28 PROCEDURE — 99284 EMERGENCY DEPT VISIT MOD MDM: CPT | Mod: 25

## 2024-06-28 PROCEDURE — 70450 CT HEAD/BRAIN W/O DYE: CPT | Mod: MC

## 2024-06-28 PROCEDURE — 36415 COLL VENOUS BLD VENIPUNCTURE: CPT

## 2024-06-28 PROCEDURE — 96375 TX/PRO/DX INJ NEW DRUG ADDON: CPT

## 2024-06-28 PROCEDURE — 96376 TX/PRO/DX INJ SAME DRUG ADON: CPT

## 2024-06-28 PROCEDURE — 85025 COMPLETE CBC W/AUTO DIFF WBC: CPT

## 2024-06-28 PROCEDURE — 96365 THER/PROPH/DIAG IV INF INIT: CPT

## 2024-06-28 PROCEDURE — 70450 CT HEAD/BRAIN W/O DYE: CPT | Mod: 26,MC

## 2024-06-28 RX ORDER — KETOROLAC TROMETHAMINE 30 MG/ML
15 SYRINGE (ML) INJECTION ONCE
Refills: 0 | Status: DISCONTINUED | OUTPATIENT
Start: 2024-06-28 | End: 2024-06-28

## 2024-06-28 RX ORDER — SODIUM CHLORIDE 9 MG/ML
1000 INJECTION INTRAMUSCULAR; INTRAVENOUS; SUBCUTANEOUS ONCE
Refills: 0 | Status: COMPLETED | OUTPATIENT
Start: 2024-06-28 | End: 2024-06-28

## 2024-06-28 RX ORDER — METOCLOPRAMIDE HCL 10 MG
10 TABLET ORAL ONCE
Refills: 0 | Status: COMPLETED | OUTPATIENT
Start: 2024-06-28 | End: 2024-06-28

## 2024-06-28 RX ORDER — ACETAMINOPHEN 500 MG
975 TABLET ORAL ONCE
Refills: 0 | Status: COMPLETED | OUTPATIENT
Start: 2024-06-28 | End: 2024-06-28

## 2024-06-28 RX ADMIN — Medication 104 MILLIGRAM(S): at 11:28

## 2024-06-28 RX ADMIN — Medication 15 MILLIGRAM(S): at 13:28

## 2024-06-28 RX ADMIN — Medication 10 MILLIGRAM(S): at 12:00

## 2024-06-28 RX ADMIN — Medication 975 MILLIGRAM(S): at 12:10

## 2024-06-28 RX ADMIN — SODIUM CHLORIDE 1000 MILLILITER(S): 9 INJECTION INTRAMUSCULAR; INTRAVENOUS; SUBCUTANEOUS at 11:28

## 2024-06-28 RX ADMIN — Medication 975 MILLIGRAM(S): at 11:28

## 2024-06-28 RX ADMIN — SODIUM CHLORIDE 1000 MILLILITER(S): 9 INJECTION INTRAMUSCULAR; INTRAVENOUS; SUBCUTANEOUS at 12:30

## 2024-06-28 RX ADMIN — Medication 15 MILLIGRAM(S): at 12:42

## 2024-06-28 NOTE — ED PROVIDER NOTE - CLINICAL SUMMARY MEDICAL DECISION MAKING FREE TEXT BOX
53-year-old presents today with headaches.  Patient has previous history of headaches.  Current headache described as gradually getting worse.  low suspicion for subarachnoid hemorrhage.  Given patient's previous history, CT head was performed.  Patient was discharged with close follow-up after improvement in symptomatology noted.  Neurological exam unremarkable.

## 2024-06-28 NOTE — ED PROVIDER NOTE - NSFOLLOWUPCLINICS_GEN_ALL_ED_FT
[facility]  Neurology  [address] [place] [address], [facility] [address]  [place] [address]  Phone: [phone]  Fax:   Follow Up Time: 1-3 Days

## 2024-06-28 NOTE — ED PROVIDER NOTE - NSFOLLOWUPINSTRUCTIONS_ED_ALL_ED_FT
Please follow up with your neurologist and primary care physician within 24-72 hours and return immediately if symptoms worsen.    General Headache Without Cause  A headache is pain or discomfort felt around the head or neck area. The specific cause of a headache may not be found. There are many causes and types of headaches. A few common ones are:    Tension headaches.  Migraine headaches.  Cluster headaches.  Chronic daily headaches.    Follow these instructions at home:  Watch your condition for any changes. Take these steps to help with your condition:    Managing pain     Take over-the-counter and prescription medicines only as told by your health care provider.  Lie down in a dark, quiet room when you have a headache.  If directed, apply ice to the head and neck area:    Put ice in a plastic bag.  Place a towel between your skin and the bag.  Leave the ice on for 20 minutes, 2–3 times per day.    Use a heating pad or hot shower to apply heat to the head and neck area as told by your health care provider.  ImageKeep lights dim if bright lights bother you or make your headaches worse.  Eating and drinking     Eat meals on a regular schedule.  Limit alcohol use.  Decrease the amount of caffeine you drink, or stop drinking caffeine.  General instructions     Keep all follow-up visits as told by your health care provider. This is important.  Keep a headache journal to help find out what may trigger your headaches. For example, write down:    What you eat and drink.  How much sleep you get.  Any change to your diet or medicines.    Try massage or other relaxation techniques.  Limit stress.  Sit up straight, and do not tense your muscles.  Do not use tobacco products, including cigarettes, chewing tobacco, or e-cigarettes. If you need help quitting, ask your health care provider.  Exercise regularly as told by your health care provider.  ImageSleep on a regular schedule. Get 7–9 hours of sleep, or the amount recommended by your health care provider.  Contact a health care provider if:  Your symptoms are not helped by medicine.  You have a headache that is different from the usual headache.  You have nausea or you vomit.  You have a fever.  Get help right away if:  Your headache becomes severe.  You have repeated vomiting.  You have a stiff neck.  You have a loss of vision.  You have problems with speech.  You have pain in the eye or ear.  You have muscular weakness or loss of muscle control.  You lose your balance or have trouble walking.  You feel faint or pass out.  You have confusion.  This information is not intended to replace advice given to you by your health care provider. Make sure you discuss any questions you have with your health care provider.

## 2024-06-28 NOTE — ED PROVIDER NOTE - PATIENT PORTAL LINK FT
You can access the FollowMyHealth Patient Portal offered by [facility] by registering at the following website: [email or web address]. By joining [employer]’s FollowMyHealth portal, you will also be able to view your health information using other applications (apps) compatible with our system.

## 2024-06-28 NOTE — ED PROVIDER NOTE - OBJECTIVE STATEMENT
54 yo female with a pmh of migraines and menigioma presents c/o T temporal headache for 2 days. pt describes the pain as sharp in nature. pt denies any visual changes, numbness, or tingling. pt denies any other symptoms including fevers, chill, recent illness/travel, cough, abdominal pain, chest pain, or SOB.

## 2024-06-28 NOTE — ED ADULT NURSE NOTE - ALCOHOL PRE SCREEN (AUDIT - C)
How Severe Is It?: moderate Is This A New Presentation, Or A Follow-Up?: Follow Up Isotretinoin Statement Selected

## 2024-07-02 DIAGNOSIS — R51.9 HEADACHE, UNSPECIFIED: ICD-10-CM

## 2024-07-17 ENCOUNTER — NON-APPOINTMENT (OUTPATIENT)
Age: 54
End: 2024-07-17

## 2024-07-18 ENCOUNTER — APPOINTMENT (OUTPATIENT)
Dept: NEUROLOGY | Facility: CLINIC | Age: 54
End: 2024-07-18
Payer: COMMERCIAL

## 2024-07-18 VITALS
TEMPERATURE: 98 F | WEIGHT: 147 LBS | DIASTOLIC BLOOD PRESSURE: 75 MMHG | SYSTOLIC BLOOD PRESSURE: 107 MMHG | HEIGHT: 61 IN | HEART RATE: 84 BPM | BODY MASS INDEX: 27.75 KG/M2 | OXYGEN SATURATION: 98 %

## 2024-07-18 PROCEDURE — 99215 OFFICE O/P EST HI 40 MIN: CPT

## 2024-07-18 PROCEDURE — G2211 COMPLEX E/M VISIT ADD ON: CPT | Mod: NC

## 2024-07-18 RX ORDER — SUMATRIPTAN 5 MG/1
5 SPRAY NASAL
Refills: 0 | Status: ACTIVE | COMMUNITY

## 2024-07-18 RX ORDER — BUTALBITAL, ACETAMINOPHEN AND CAFFEINE 300; 50; 40 MG/1; MG/1; MG/1
50-300-40 CAPSULE ORAL 3 TIMES DAILY
Qty: 90 | Refills: 2 | Status: ACTIVE | COMMUNITY
Start: 2024-07-18 | End: 1900-01-01

## 2024-07-18 RX ORDER — METHYLPREDNISOLONE 4 MG/1
4 TABLET ORAL
Qty: 1 | Refills: 1 | Status: ACTIVE | COMMUNITY
Start: 2024-07-18 | End: 1900-01-01

## 2024-07-18 RX ORDER — INDOMETHACIN 25 MG/1
25 CAPSULE ORAL TWICE DAILY
Qty: 120 | Refills: 2 | Status: ACTIVE | COMMUNITY
Start: 2024-07-02 | End: 1900-01-01

## 2024-08-22 ENCOUNTER — RESULT REVIEW (OUTPATIENT)
Age: 54
End: 2024-08-22

## 2024-08-22 ENCOUNTER — OUTPATIENT (OUTPATIENT)
Dept: OUTPATIENT SERVICES | Facility: HOSPITAL | Age: 54
LOS: 1 days | End: 2024-08-22
Payer: COMMERCIAL

## 2024-08-22 DIAGNOSIS — Z90.711 ACQUIRED ABSENCE OF UTERUS WITH REMAINING CERVICAL STUMP: Chronic | ICD-10-CM

## 2024-08-22 DIAGNOSIS — Z00.8 ENCOUNTER FOR OTHER GENERAL EXAMINATION: ICD-10-CM

## 2024-08-22 DIAGNOSIS — R51.9 HEADACHE, UNSPECIFIED: ICD-10-CM

## 2024-08-22 PROCEDURE — 70544 MR ANGIOGRAPHY HEAD W/O DYE: CPT | Mod: 26,59

## 2024-08-22 PROCEDURE — 70544 MR ANGIOGRAPHY HEAD W/O DYE: CPT

## 2024-08-22 PROCEDURE — 70553 MRI BRAIN STEM W/O & W/DYE: CPT | Mod: 26

## 2024-08-22 PROCEDURE — 70553 MRI BRAIN STEM W/O & W/DYE: CPT

## 2024-08-22 PROCEDURE — A9579: CPT

## 2024-08-23 DIAGNOSIS — R51.9 HEADACHE, UNSPECIFIED: ICD-10-CM

## 2024-08-28 ENCOUNTER — NON-APPOINTMENT (OUTPATIENT)
Age: 54
End: 2024-08-28

## 2024-09-16 ENCOUNTER — APPOINTMENT (OUTPATIENT)
Dept: NEUROLOGY | Facility: CLINIC | Age: 54
End: 2024-09-16
Payer: COMMERCIAL

## 2024-09-16 PROCEDURE — 95874 GUIDE NERV DESTR NEEDLE EMG: CPT

## 2024-09-16 PROCEDURE — 64615 CHEMODENERV MUSC MIGRAINE: CPT

## 2024-09-16 PROCEDURE — 99213 OFFICE O/P EST LOW 20 MIN: CPT | Mod: 25

## 2024-09-16 NOTE — HISTORY OF PRESENT ILLNESS
[FreeTextEntry1] : Since her last visit, Ms. Chance continues to have recurrent facial neuralgias and intermittent HA.  Also continues to have migraines with nausea that's debilitating typically 1- 2X per week.  Initially had good response to chemodenervation but now continues to have recurrent migraines despite Nurtec QOD in addition to chemodenervation.  Denies any change in HA semiology.  currently only taking Nurtec 75 QD, Topamax 200 mg BID, Frioriet PRN, Reglan ODT.  Not taking indomethacin or imitrex currently.  Take Baclofen PRN for facial pain which is relatively well controlled.  Tolerating medications and chemodenervation well.  Had recent MRI/MRA with stable meningioma.

## 2024-09-16 NOTE — PROCEDURE
[FreeTextEntry1] : Procedure: Botulinum toxin for chronic migraine  Risks and benefits of procedure explained. Consent for botulinum toxin for chronic migraine signed in chart.  200 Units botulinum toxin (Lot # C3, exp 8/26) reconstituted with 4 ml 0.9NS (5U/0.1cc).  Patient prepped with alcohol/ethyl chloride. 10U corrugators, 5U procerus, 20U frontalis, 20 L temporalis (R temporalis not injected due to post-surgical changes), 30 occipitalis, 20 cervical paraspinals, 30 trapezius. Total of 135U divided in 27 sites.   Patient tolerated procedure well without complications.

## 2024-09-16 NOTE — PHYSICAL EXAM
[FreeTextEntry1] : NAD. AOx3. Intact memory. Speech fluent, Non dysarthric. CN 2 - 12 Right ptosis mild. slight dysconjugate gaze with horizontal eye movements. R eye partial adduction defect but vertical and abduction intact. decrease sensation over V1-V3 branches of the trigeminal N No facial tenderness to palpation or allodynia.  Cervical paraspinal tenderness to palpation Strength 5/5 RUE, b/l LE. (-) TTP or allodynia R temporal or L occiput.  DTRs 2+ throughout. Plantar response downgoing b/l. (-) Hoffmans, clonus. Sensory intact LT/PP, pain, temp, proprioception and vibration. NL FTN/HKS. No dysdiadokinesia. Gait narrow based/NL tandem.

## 2024-09-16 NOTE — ASSESSMENT
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 Pt tolerated chemodenervation well without complications. Since still having recurrent migraines despite Nurtec, recommend start injections with Emgality and switch Nurtec to PRN only.    Recommendations: - continue topamax 200 mg BID - continue nurtec 75 mg PRN - continue baclofen 5 mg q6 prn - magnesium oxide 400 mg BID PRN - start Emgality 240 mg SC x 1 followed by 120 mg SC monthly - RTC in 3 months for continued chemodenervation.

## 2024-09-18 RX ORDER — GALCANEZUMAB 120 MG/ML
120 INJECTION, SOLUTION SUBCUTANEOUS
Qty: 1 | Refills: 5 | Status: ACTIVE | COMMUNITY
Start: 2024-09-16 | End: 1900-01-01

## 2024-09-18 RX ORDER — GALCANEZUMAB 120 MG/ML
120 INJECTION, SOLUTION SUBCUTANEOUS
Qty: 2 | Refills: 0 | Status: ACTIVE | COMMUNITY
Start: 2024-09-16 | End: 1900-01-01

## 2024-11-11 ENCOUNTER — APPOINTMENT (OUTPATIENT)
Dept: ORTHOPEDIC SURGERY | Facility: CLINIC | Age: 54
End: 2024-11-11
Payer: COMMERCIAL

## 2024-11-11 DIAGNOSIS — S93.601A UNSPECIFIED SPRAIN OF RIGHT FOOT, INITIAL ENCOUNTER: ICD-10-CM

## 2024-11-11 DIAGNOSIS — S82.831A OTHER FRACTURE OF UPPER AND LOWER END OF RIGHT FIBULA, INITIAL ENCOUNTER FOR CLOSED FRACTURE: ICD-10-CM

## 2024-11-11 PROCEDURE — 73630 X-RAY EXAM OF FOOT: CPT | Mod: RT

## 2024-11-11 PROCEDURE — 73610 X-RAY EXAM OF ANKLE: CPT | Mod: RT

## 2024-11-11 PROCEDURE — 99203 OFFICE O/P NEW LOW 30 MIN: CPT | Mod: 25

## 2024-11-11 RX ORDER — NAPROXEN 500 MG/1
500 TABLET ORAL
Qty: 60 | Refills: 0 | Status: ACTIVE | COMMUNITY
Start: 2024-11-11 | End: 1900-01-01

## 2024-11-25 ENCOUNTER — APPOINTMENT (OUTPATIENT)
Facility: CLINIC | Age: 54
End: 2024-11-25
Payer: COMMERCIAL

## 2024-11-25 DIAGNOSIS — S82.831A OTHER FRACTURE OF UPPER AND LOWER END OF RIGHT FIBULA, INITIAL ENCOUNTER FOR CLOSED FRACTURE: ICD-10-CM

## 2024-11-25 PROCEDURE — 27786 TREATMENT OF ANKLE FRACTURE: CPT | Mod: RT

## 2024-11-25 PROCEDURE — 73610 X-RAY EXAM OF ANKLE: CPT | Mod: RT

## 2024-11-25 PROCEDURE — 99204 OFFICE O/P NEW MOD 45 MIN: CPT | Mod: 57

## 2024-12-13 ENCOUNTER — APPOINTMENT (OUTPATIENT)
Dept: OBGYN | Facility: CLINIC | Age: 54
End: 2024-12-13

## 2024-12-18 ENCOUNTER — APPOINTMENT (OUTPATIENT)
Dept: ORTHOPEDIC SURGERY | Facility: CLINIC | Age: 54
End: 2024-12-18
Payer: COMMERCIAL

## 2024-12-18 ENCOUNTER — RESULT CHARGE (OUTPATIENT)
Age: 54
End: 2024-12-18

## 2024-12-18 DIAGNOSIS — S82.831A OTHER FRACTURE OF UPPER AND LOWER END OF RIGHT FIBULA, INITIAL ENCOUNTER FOR CLOSED FRACTURE: ICD-10-CM

## 2024-12-18 PROCEDURE — 73610 X-RAY EXAM OF ANKLE: CPT | Mod: RT

## 2024-12-18 PROCEDURE — 99024 POSTOP FOLLOW-UP VISIT: CPT

## 2025-01-02 ENCOUNTER — NON-APPOINTMENT (OUTPATIENT)
Age: 55
End: 2025-01-02

## 2025-01-02 ENCOUNTER — APPOINTMENT (OUTPATIENT)
Dept: NEUROLOGY | Facility: CLINIC | Age: 55
End: 2025-01-02
Payer: COMMERCIAL

## 2025-01-02 PROCEDURE — 99213 OFFICE O/P EST LOW 20 MIN: CPT | Mod: 25

## 2025-01-02 PROCEDURE — 64615 CHEMODENERV MUSC MIGRAINE: CPT

## 2025-01-02 RX ORDER — ONABOTULINUMTOXINA 100 [USP'U]/1
100 INJECTION, POWDER, LYOPHILIZED, FOR SOLUTION INTRADERMAL; INTRAMUSCULAR
Refills: 0 | Status: ACTIVE | COMMUNITY

## 2025-01-02 RX ADMIN — ONABOTULINUMTOXINA 1.55 UNIT: 100 INJECTION, POWDER, LYOPHILIZED, FOR SOLUTION INTRADERMAL; INTRAMUSCULAR at 00:00

## 2025-01-22 ENCOUNTER — APPOINTMENT (OUTPATIENT)
Dept: ORTHOPEDIC SURGERY | Facility: CLINIC | Age: 55
End: 2025-01-22

## 2025-02-28 ENCOUNTER — APPOINTMENT (OUTPATIENT)
Dept: PAIN MANAGEMENT | Facility: CLINIC | Age: 55
End: 2025-02-28
Payer: COMMERCIAL

## 2025-02-28 DIAGNOSIS — M54.16 RADICULOPATHY, LUMBAR REGION: ICD-10-CM

## 2025-02-28 PROCEDURE — 99204 OFFICE O/P NEW MOD 45 MIN: CPT

## 2025-02-28 RX ORDER — CYCLOBENZAPRINE HYDROCHLORIDE 10 MG/1
10 TABLET, FILM COATED ORAL
Qty: 60 | Refills: 0 | Status: ACTIVE | COMMUNITY
Start: 2025-02-28 | End: 1900-01-01

## 2025-02-28 RX ORDER — IBUPROFEN 800 MG/1
800 TABLET, FILM COATED ORAL
Qty: 60 | Refills: 1 | Status: ACTIVE | COMMUNITY
Start: 2025-02-28 | End: 1900-01-01

## 2025-02-28 RX ORDER — METHYLPREDNISOLONE 4 MG/1
4 TABLET ORAL
Qty: 1 | Refills: 0 | Status: ACTIVE | COMMUNITY
Start: 2025-02-28 | End: 1900-01-01

## 2025-03-10 NOTE — PATIENT PROFILE ADULT - NSPRESCRALCAMT_GEN_A_NUR
Seen patient in the ICU for multiple wounds in the feet  Reviewed the chart and available results  Superficial stable eschared wounds bilateral no deep involvement except on the right posterior heel with necrotic eschar stable as well with surrounding partial to full thickness pressure ulcer  No need for any surgical treatment   Signs of adequate perfusion   Continue current wound care   Would benefit from unna boot compression if tolerates the compression wrapping   No further involvement from the podiatry is needed at this point     1 or 2

## 2025-03-14 ENCOUNTER — APPOINTMENT (OUTPATIENT)
Dept: PAIN MANAGEMENT | Facility: CLINIC | Age: 55
End: 2025-03-14
Payer: COMMERCIAL

## 2025-03-14 DIAGNOSIS — M54.50 LOW BACK PAIN, UNSPECIFIED: ICD-10-CM

## 2025-03-14 DIAGNOSIS — M54.16 RADICULOPATHY, LUMBAR REGION: ICD-10-CM

## 2025-03-14 PROCEDURE — 99214 OFFICE O/P EST MOD 30 MIN: CPT

## 2025-04-21 ENCOUNTER — APPOINTMENT (OUTPATIENT)
Dept: NEUROLOGY | Facility: CLINIC | Age: 55
End: 2025-04-21
Payer: COMMERCIAL

## 2025-04-21 PROCEDURE — 64615 CHEMODENERV MUSC MIGRAINE: CPT

## 2025-04-21 PROCEDURE — 99214 OFFICE O/P EST MOD 30 MIN: CPT | Mod: 25

## 2025-04-21 RX ORDER — FREMANEZUMAB-VFRM 225 MG/1.5ML
225 INJECTION SUBCUTANEOUS
Qty: 1 | Refills: 5 | Status: ACTIVE | COMMUNITY
Start: 2025-04-21 | End: 1900-01-01

## 2025-07-14 ENCOUNTER — RX RENEWAL (OUTPATIENT)
Age: 55
End: 2025-07-14

## 2025-07-28 ENCOUNTER — APPOINTMENT (OUTPATIENT)
Dept: NEUROLOGY | Facility: CLINIC | Age: 55
End: 2025-07-28
Payer: COMMERCIAL

## 2025-07-28 PROCEDURE — 99212 OFFICE O/P EST SF 10 MIN: CPT | Mod: 25

## 2025-07-28 PROCEDURE — 64615 CHEMODENERV MUSC MIGRAINE: CPT

## 2025-07-28 RX ORDER — GALCANEZUMAB 120 MG/ML
120 INJECTION, SOLUTION SUBCUTANEOUS
Qty: 1 | Refills: 6 | Status: ACTIVE | COMMUNITY
Start: 2025-07-28

## 2025-07-28 RX ADMIN — ONABOTULINUMTOXINA 1.35 UNIT: 100 INJECTION, POWDER, LYOPHILIZED, FOR SOLUTION INTRADERMAL; INTRAMUSCULAR at 00:00
